# Patient Record
Sex: MALE | Race: WHITE | Employment: FULL TIME | URBAN - METROPOLITAN AREA
[De-identification: names, ages, dates, MRNs, and addresses within clinical notes are randomized per-mention and may not be internally consistent; named-entity substitution may affect disease eponyms.]

---

## 2017-01-25 ENCOUNTER — OFFICE VISIT (OUTPATIENT)
Dept: INTERNAL MEDICINE CLINIC | Age: 47
End: 2017-01-25

## 2017-01-25 VITALS
HEIGHT: 72 IN | HEART RATE: 76 BPM | SYSTOLIC BLOOD PRESSURE: 118 MMHG | WEIGHT: 252 LBS | DIASTOLIC BLOOD PRESSURE: 84 MMHG | BODY MASS INDEX: 34.13 KG/M2

## 2017-01-25 DIAGNOSIS — I10 ESSENTIAL HYPERTENSION: ICD-10-CM

## 2017-01-25 DIAGNOSIS — L60.0 INGROWN RIGHT BIG TOENAIL: Primary | ICD-10-CM

## 2017-01-25 LAB
A/G RATIO: 1.7 (ref 1.1–2.2)
ALBUMIN SERPL-MCNC: 4.3 G/DL (ref 3.4–5)
ALP BLD-CCNC: 57 U/L (ref 40–129)
ALT SERPL-CCNC: 44 U/L (ref 10–40)
ANION GAP SERPL CALCULATED.3IONS-SCNC: 14 MMOL/L (ref 3–16)
AST SERPL-CCNC: 22 U/L (ref 15–37)
BILIRUB SERPL-MCNC: 0.5 MG/DL (ref 0–1)
BUN BLDV-MCNC: 19 MG/DL (ref 7–20)
CALCIUM SERPL-MCNC: 9.3 MG/DL (ref 8.3–10.6)
CHLORIDE BLD-SCNC: 104 MMOL/L (ref 99–110)
CO2: 26 MMOL/L (ref 21–32)
CREAT SERPL-MCNC: 1.2 MG/DL (ref 0.9–1.3)
GFR AFRICAN AMERICAN: >60
GFR NON-AFRICAN AMERICAN: >60
GLOBULIN: 2.5 G/DL
GLUCOSE BLD-MCNC: 101 MG/DL (ref 70–99)
POTASSIUM SERPL-SCNC: 4.6 MMOL/L (ref 3.5–5.1)
SODIUM BLD-SCNC: 144 MMOL/L (ref 136–145)
TOTAL PROTEIN: 6.8 G/DL (ref 6.4–8.2)
TSH REFLEX: 1.85 UIU/ML (ref 0.27–4.2)

## 2017-01-25 PROCEDURE — 99213 OFFICE O/P EST LOW 20 MIN: CPT | Performed by: INTERNAL MEDICINE

## 2017-01-25 RX ORDER — CEPHALEXIN 500 MG/1
500 CAPSULE ORAL 4 TIMES DAILY
Qty: 40 CAPSULE | Refills: 0 | Status: SHIPPED | OUTPATIENT
Start: 2017-01-25 | End: 2017-02-04

## 2017-01-25 RX ORDER — BENAZEPRIL HYDROCHLORIDE AND HYDROCHLOROTHIAZIDE 20; 12.5 MG/1; MG/1
1 TABLET ORAL DAILY
Qty: 90 TABLET | Refills: 1 | Status: SHIPPED | OUTPATIENT
Start: 2017-01-25 | End: 2017-09-19 | Stop reason: SDUPTHER

## 2017-01-25 ASSESSMENT — ENCOUNTER SYMPTOMS
NAUSEA: 0
COUGH: 0
WHEEZING: 0
VOMITING: 0
ABDOMINAL PAIN: 0

## 2017-01-25 ASSESSMENT — PATIENT HEALTH QUESTIONNAIRE - PHQ9
SUM OF ALL RESPONSES TO PHQ9 QUESTIONS 1 & 2: 0
1. LITTLE INTEREST OR PLEASURE IN DOING THINGS: 0
2. FEELING DOWN, DEPRESSED OR HOPELESS: 0
SUM OF ALL RESPONSES TO PHQ QUESTIONS 1-9: 0

## 2017-09-19 ENCOUNTER — OFFICE VISIT (OUTPATIENT)
Dept: INTERNAL MEDICINE CLINIC | Age: 47
End: 2017-09-19

## 2017-09-19 VITALS
SYSTOLIC BLOOD PRESSURE: 128 MMHG | HEART RATE: 72 BPM | WEIGHT: 260.6 LBS | HEIGHT: 72 IN | BODY MASS INDEX: 35.3 KG/M2 | DIASTOLIC BLOOD PRESSURE: 80 MMHG

## 2017-09-19 DIAGNOSIS — I10 ESSENTIAL HYPERTENSION: ICD-10-CM

## 2017-09-19 DIAGNOSIS — E78.5 HYPERLIPIDEMIA, UNSPECIFIED HYPERLIPIDEMIA TYPE: ICD-10-CM

## 2017-09-19 DIAGNOSIS — E66.9 OBESITY (BMI 30-39.9): ICD-10-CM

## 2017-09-19 DIAGNOSIS — I10 ESSENTIAL HYPERTENSION: Primary | ICD-10-CM

## 2017-09-19 LAB
ANION GAP SERPL CALCULATED.3IONS-SCNC: 14 MMOL/L (ref 3–16)
BILIRUBIN URINE: NEGATIVE
BLOOD, URINE: NEGATIVE
BUN BLDV-MCNC: 19 MG/DL (ref 7–20)
CALCIUM SERPL-MCNC: 9.6 MG/DL (ref 8.3–10.6)
CHLORIDE BLD-SCNC: 102 MMOL/L (ref 99–110)
CLARITY: CLEAR
CO2: 24 MMOL/L (ref 21–32)
COLOR: YELLOW
CREAT SERPL-MCNC: 1.3 MG/DL (ref 0.9–1.3)
GFR AFRICAN AMERICAN: >60
GFR NON-AFRICAN AMERICAN: 59
GLUCOSE BLD-MCNC: 96 MG/DL (ref 70–99)
GLUCOSE URINE: NEGATIVE MG/DL
KETONES, URINE: NEGATIVE MG/DL
LEUKOCYTE ESTERASE, URINE: NEGATIVE
MICROSCOPIC EXAMINATION: NORMAL
NITRITE, URINE: NEGATIVE
PH UA: 6
POTASSIUM SERPL-SCNC: 4.5 MMOL/L (ref 3.5–5.1)
PROTEIN UA: NEGATIVE MG/DL
SODIUM BLD-SCNC: 140 MMOL/L (ref 136–145)
SPECIFIC GRAVITY UA: 1.01
URINE TYPE: NORMAL
UROBILINOGEN, URINE: 0.2 E.U./DL

## 2017-09-19 PROCEDURE — 99213 OFFICE O/P EST LOW 20 MIN: CPT | Performed by: INTERNAL MEDICINE

## 2017-09-19 RX ORDER — BENAZEPRIL HYDROCHLORIDE AND HYDROCHLOROTHIAZIDE 20; 12.5 MG/1; MG/1
1 TABLET ORAL DAILY
Qty: 90 TABLET | Refills: 1 | Status: SHIPPED | OUTPATIENT
Start: 2017-09-19 | End: 2018-05-21 | Stop reason: SDUPTHER

## 2017-09-19 ASSESSMENT — ENCOUNTER SYMPTOMS
COUGH: 0
SHORTNESS OF BREATH: 0
WHEEZING: 0

## 2017-09-20 LAB
ESTIMATED AVERAGE GLUCOSE: 111.2 MG/DL
HBA1C MFR BLD: 5.5 %

## 2018-03-21 ENCOUNTER — OFFICE VISIT (OUTPATIENT)
Dept: INTERNAL MEDICINE CLINIC | Age: 48
End: 2018-03-21

## 2018-03-21 VITALS
DIASTOLIC BLOOD PRESSURE: 88 MMHG | WEIGHT: 257 LBS | SYSTOLIC BLOOD PRESSURE: 112 MMHG | HEART RATE: 72 BPM | BODY MASS INDEX: 35.34 KG/M2

## 2018-03-21 DIAGNOSIS — E78.5 HYPERLIPIDEMIA, UNSPECIFIED HYPERLIPIDEMIA TYPE: ICD-10-CM

## 2018-03-21 DIAGNOSIS — B02.9 HERPES ZOSTER WITHOUT COMPLICATION: Primary | ICD-10-CM

## 2018-03-21 DIAGNOSIS — E66.9 OBESITY (BMI 30-39.9): ICD-10-CM

## 2018-03-21 DIAGNOSIS — I10 ESSENTIAL HYPERTENSION: ICD-10-CM

## 2018-03-21 PROCEDURE — 99214 OFFICE O/P EST MOD 30 MIN: CPT | Performed by: INTERNAL MEDICINE

## 2018-03-21 RX ORDER — ATORVASTATIN CALCIUM 20 MG/1
20 TABLET, FILM COATED ORAL DAILY
Qty: 30 TABLET | Refills: 3 | Status: SHIPPED | OUTPATIENT
Start: 2018-03-21 | End: 2018-07-16 | Stop reason: SDUPTHER

## 2018-03-21 RX ORDER — VALACYCLOVIR HYDROCHLORIDE 1 G/1
1000 TABLET, FILM COATED ORAL 2 TIMES DAILY
Qty: 14 TABLET | Refills: 0 | Status: SHIPPED | OUTPATIENT
Start: 2018-03-21 | End: 2018-03-28 | Stop reason: ALTCHOICE

## 2018-03-21 ASSESSMENT — ENCOUNTER SYMPTOMS
VOMITING: 0
EYE PAIN: 0
PHOTOPHOBIA: 0
WHEEZING: 0
ABDOMINAL PAIN: 0
NAUSEA: 0
EYE DISCHARGE: 0
SHORTNESS OF BREATH: 0

## 2018-03-21 NOTE — PROGRESS NOTES
Eric Saenz  1970  male  50 y.o. SUBJECTIVE:    Chief Complaint   Patient presents with    Rash     facial--non painful--near lt eye.  was working in garage over the weekend. hx of chicken pox       HPI:  Patient has been complaining of gradual onset of red blisterlike rash at the left cheek area since yesterday. He denies any headache or fever chills and eye symptoms. He also brought his lipid profile results. His last LDL about 2 years ago was 178. His 10 year cardiovascular risk is about  13.4 percent    Blood pressure has been well controlled    History reviewed. No pertinent past medical history. Past Surgical History:   Procedure Laterality Date    TONSILLECTOMY AND ADENOIDECTOMY  1974     Social History     Social History    Marital status:      Spouse name: N/A    Number of children: 3    Years of education: N/A     Occupational History          [address]     Social History Main Topics    Smoking status: Never Smoker    Smokeless tobacco: Never Used    Alcohol use Yes      Comment: socially, varies    Drug use: No    Sexual activity: Yes     Partners: Female     Other Topics Concern    None     Social History Narrative    None     Family History   Problem Relation Age of Onset    Migraines Mother     High Blood Pressure Father     Heart Surgery Father      cabg x3    Heart Failure Maternal Grandmother     Diabetes Other      aunt, niece       Review of Systems   Constitutional: Negative for diaphoresis and unexpected weight change. Eyes: Negative for photophobia, pain, discharge and visual disturbance. Respiratory: Negative for shortness of breath and wheezing. Cardiovascular: Negative for chest pain and leg swelling. Gastrointestinal: Negative for abdominal pain, nausea and vomiting. Musculoskeletal: Negative for neck pain and neck stiffness. Skin: Positive for rash.    Neurological: Negative for dizziness, syncope, speech difficulty, weakness, light-headedness, numbness and headaches. Hematological: Negative for adenopathy. Does not bruise/bleed easily. OBJECTIVE:  Pulse Readings from Last 4 Encounters:   03/21/18 72   09/19/17 72   01/25/17 76   12/14/16 80      Wt Readings from Last 4 Encounters:   03/21/18 257 lb (116.6 kg)   09/19/17 260 lb 9.6 oz (118.2 kg)   01/25/17 252 lb (114.3 kg)   12/14/16 254 lb (115.2 kg)     BP Readings from Last 4 Encounters:   03/21/18 112/88   09/19/17 128/80   01/25/17 118/84   12/14/16 (!) 160/102     Physical Exam   Constitutional: He is oriented to person, place, and time. He appears well-developed and well-nourished. No distress. Eyes: Conjunctivae and EOM are normal. Pupils are equal, round, and reactive to light. Neck: Neck supple. No thyromegaly present. Cardiovascular: Normal rate, regular rhythm and normal heart sounds. Pulmonary/Chest: Effort normal and breath sounds normal. No respiratory distress. He has no wheezes. Musculoskeletal: He exhibits no edema or tenderness. Lymphadenopathy:     He has no cervical adenopathy. Neurological: He is alert and oriented to person, place, and time. No cranial nerve deficit. Skin: Rash noted. Papulovesicular erythematous rash at the left cheek area   Psychiatric: He has a normal mood and affect. Nursing note and vitals reviewed.       CBC:   Lab Results   Component Value Date    WBC 4.5 03/12/2010    HGB 16.9 03/12/2010    HCT 49.6 03/12/2010     03/12/2010     CMP:   Lab Results   Component Value Date     09/19/2017    K 4.5 09/19/2017     09/19/2017    CO2 24 09/19/2017    ANIONGAP 14 09/19/2017    GLUCOSE 96 09/19/2017    BUN 19 09/19/2017    CREATININE 1.3 09/19/2017    GFRAA >60 09/19/2017    GFRAA >60 03/12/2010    CALCIUM 9.6 09/19/2017    PROT 6.8 01/25/2017    LABALBU 4.3 01/25/2017    AGRATIO 1.7 01/25/2017    BILITOT 0.5 01/25/2017    ALKPHOS 57 01/25/2017    ALT 44 01/25/2017    AST 22

## 2018-03-28 ENCOUNTER — OFFICE VISIT (OUTPATIENT)
Dept: INTERNAL MEDICINE CLINIC | Age: 48
End: 2018-03-28

## 2018-03-28 VITALS
BODY MASS INDEX: 34.27 KG/M2 | DIASTOLIC BLOOD PRESSURE: 86 MMHG | HEIGHT: 72 IN | HEART RATE: 72 BPM | SYSTOLIC BLOOD PRESSURE: 118 MMHG | WEIGHT: 253 LBS

## 2018-03-28 DIAGNOSIS — I10 ESSENTIAL HYPERTENSION: ICD-10-CM

## 2018-03-28 DIAGNOSIS — I10 ESSENTIAL HYPERTENSION: Primary | ICD-10-CM

## 2018-03-28 DIAGNOSIS — L30.9 ECZEMA, UNSPECIFIED TYPE: ICD-10-CM

## 2018-03-28 DIAGNOSIS — E78.5 HYPERLIPIDEMIA, UNSPECIFIED HYPERLIPIDEMIA TYPE: ICD-10-CM

## 2018-03-28 LAB
ANION GAP SERPL CALCULATED.3IONS-SCNC: 15 MMOL/L (ref 3–16)
AST SERPL-CCNC: 23 U/L (ref 15–37)
BUN BLDV-MCNC: 17 MG/DL (ref 7–20)
CALCIUM SERPL-MCNC: 9.5 MG/DL (ref 8.3–10.6)
CHLORIDE BLD-SCNC: 98 MMOL/L (ref 99–110)
CO2: 28 MMOL/L (ref 21–32)
CREAT SERPL-MCNC: 1.4 MG/DL (ref 0.9–1.3)
GFR AFRICAN AMERICAN: >60
GFR NON-AFRICAN AMERICAN: 54
GLUCOSE BLD-MCNC: 76 MG/DL (ref 70–99)
POTASSIUM SERPL-SCNC: 4.5 MMOL/L (ref 3.5–5.1)
SODIUM BLD-SCNC: 141 MMOL/L (ref 136–145)

## 2018-03-28 PROCEDURE — 99214 OFFICE O/P EST MOD 30 MIN: CPT | Performed by: INTERNAL MEDICINE

## 2018-03-28 ASSESSMENT — ENCOUNTER SYMPTOMS
ABDOMINAL PAIN: 0
WHEEZING: 0
SHORTNESS OF BREATH: 0
VOMITING: 0
NAUSEA: 0

## 2018-03-28 ASSESSMENT — PATIENT HEALTH QUESTIONNAIRE - PHQ9
2. FEELING DOWN, DEPRESSED OR HOPELESS: 0
SUM OF ALL RESPONSES TO PHQ9 QUESTIONS 1 & 2: 0
1. LITTLE INTEREST OR PLEASURE IN DOING THINGS: 0
SUM OF ALL RESPONSES TO PHQ QUESTIONS 1-9: 0

## 2018-03-28 NOTE — PROGRESS NOTES
Subjective:      Chief Complaint   Patient presents with    6 Month Follow-Up    Herpes Zoster     saw opthamologist-no blurred vision--cheek area feels a little tender. Patient ID: Luzma Givens is a 50 y.o. male. HPI  Hypertension:    Luzma Givens returns for follow up of hypertension. Tolerating medications well and taking them as directed. Does not check BP at home. No symptoms (denies chest pain,dyspnea,edema or TIA's or blurred vision) concerning for end organ damage are present. Hyperlipidemia:    Patient returns for follow up of hyperlipidemia. Patient has been taking His medications as prescribed. Patient's lipids are not controlled. Denies myalgias or any GI upset. Side effects related to taking the medications include none. see scanned recent lipid profile    Status post herpes zoster  infection of the left side of the face. He denies headache, visual symptoms. He is feeling slightly pain and numbness to the affected area. He is also complaining of itchy dry scaly rash at the right palmar surface for about a week. Review of Systems   Constitutional: Negative for fatigue and unexpected weight change. Respiratory: Negative for shortness of breath and wheezing. Cardiovascular: Negative for chest pain, palpitations and leg swelling. Gastrointestinal: Negative for abdominal pain, nausea and vomiting. Endocrine: Negative for polyphagia and polyuria. Skin: Positive for rash. Neurological: Negative for dizziness, tremors and headaches.      No Known Allergies  Social History     Social History    Marital status:      Spouse name: N/A    Number of children: 3    Years of education: N/A     Occupational History          [address]     Social History Main Topics    Smoking status: Never Smoker    Smokeless tobacco: Never Used    Alcohol use Yes      Comment: socially, varies    Drug use: No    Sexual activity: Yes     Partners:

## 2018-03-29 ENCOUNTER — TELEPHONE (OUTPATIENT)
Dept: INTERNAL MEDICINE CLINIC | Age: 48
End: 2018-03-29

## 2018-05-21 DIAGNOSIS — I10 ESSENTIAL HYPERTENSION: ICD-10-CM

## 2018-05-21 RX ORDER — BENAZEPRIL HYDROCHLORIDE AND HYDROCHLOROTHIAZIDE 20; 12.5 MG/1; MG/1
1 TABLET ORAL DAILY
Qty: 90 TABLET | Refills: 1 | Status: SHIPPED | OUTPATIENT
Start: 2018-05-21 | End: 2018-11-16 | Stop reason: SDUPTHER

## 2018-06-20 ENCOUNTER — NURSE ONLY (OUTPATIENT)
Dept: CARDIOLOGY CLINIC | Age: 48
End: 2018-06-20

## 2018-06-20 ENCOUNTER — TELEPHONE (OUTPATIENT)
Dept: CARDIOLOGY CLINIC | Age: 48
End: 2018-06-20

## 2018-06-20 DIAGNOSIS — Z00.00 ANNUAL PHYSICAL EXAM: Primary | ICD-10-CM

## 2018-06-20 LAB
A/G RATIO: 1.9 (ref 1.1–2.2)
ALBUMIN SERPL-MCNC: 4.6 G/DL (ref 3.4–5)
ALP BLD-CCNC: 66 U/L (ref 40–129)
ALT SERPL-CCNC: 30 U/L (ref 10–40)
ANION GAP SERPL CALCULATED.3IONS-SCNC: 14 MMOL/L (ref 3–16)
AST SERPL-CCNC: 18 U/L (ref 15–37)
BILIRUB SERPL-MCNC: 1 MG/DL (ref 0–1)
BILIRUBIN DIRECT: <0.2 MG/DL (ref 0–0.3)
BILIRUBIN, INDIRECT: NORMAL MG/DL (ref 0–1)
BUN BLDV-MCNC: 15 MG/DL (ref 7–20)
CALCIUM SERPL-MCNC: 9.6 MG/DL (ref 8.3–10.6)
CHLORIDE BLD-SCNC: 100 MMOL/L (ref 99–110)
CHOLESTEROL, TOTAL: 190 MG/DL (ref 0–199)
CO2: 27 MMOL/L (ref 21–32)
CREAT SERPL-MCNC: 1.3 MG/DL (ref 0.9–1.3)
GFR AFRICAN AMERICAN: >60
GFR NON-AFRICAN AMERICAN: 58
GLOBULIN: 2.4 G/DL
GLUCOSE BLD-MCNC: 113 MG/DL (ref 70–99)
HDLC SERPL-MCNC: 35 MG/DL (ref 40–60)
HEPATITIS C ANTIBODY INTERPRETATION: NORMAL
LDL CHOLESTEROL CALCULATED: 121 MG/DL
POTASSIUM SERPL-SCNC: 4.4 MMOL/L (ref 3.5–5.1)
PROSTATE SPECIFIC ANTIGEN: 0.79 NG/ML (ref 0–4)
REASON FOR REJECTION: NORMAL
REJECTED TEST: NORMAL
SODIUM BLD-SCNC: 141 MMOL/L (ref 136–145)
T3 TOTAL: 1.4 NG/ML (ref 0.8–2)
T4 TOTAL: 9.1 UG/DL (ref 4.5–10.9)
TOTAL PROTEIN: 7 G/DL (ref 6.4–8.2)
TRIGL SERPL-MCNC: 171 MG/DL (ref 0–150)
VLDLC SERPL CALC-MCNC: 34 MG/DL

## 2018-06-21 ENCOUNTER — NURSE ONLY (OUTPATIENT)
Dept: CARDIOLOGY CLINIC | Age: 48
End: 2018-06-21

## 2018-06-21 LAB
BASOPHILS ABSOLUTE: 0.1 K/UL (ref 0–0.2)
BASOPHILS RELATIVE PERCENT: 1 %
EOSINOPHILS ABSOLUTE: 0.3 K/UL (ref 0–0.6)
EOSINOPHILS RELATIVE PERCENT: 4.4 %
HCT VFR BLD CALC: 50 % (ref 40.5–52.5)
HEMOGLOBIN: 17.1 G/DL (ref 13.5–17.5)
LYMPHOCYTES ABSOLUTE: 1.6 K/UL (ref 1–5.1)
LYMPHOCYTES RELATIVE PERCENT: 27.7 %
MCH RBC QN AUTO: 30.3 PG (ref 26–34)
MCHC RBC AUTO-ENTMCNC: 34.3 G/DL (ref 31–36)
MCV RBC AUTO: 88.3 FL (ref 80–100)
MONOCYTES ABSOLUTE: 0.7 K/UL (ref 0–1.3)
MONOCYTES RELATIVE PERCENT: 12.5 %
NEUTROPHILS ABSOLUTE: 3.2 K/UL (ref 1.7–7.7)
NEUTROPHILS RELATIVE PERCENT: 54.4 %
PDW BLD-RTO: 13.8 % (ref 12.4–15.4)
PLATELET # BLD: 170 K/UL (ref 135–450)
PMV BLD AUTO: 10.2 FL (ref 5–10.5)
RBC # BLD: 5.66 M/UL (ref 4.2–5.9)
WBC # BLD: 6 K/UL (ref 4–11)

## 2018-06-28 ENCOUNTER — OFFICE VISIT (OUTPATIENT)
Dept: CARDIOLOGY CLINIC | Age: 48
End: 2018-06-28

## 2018-06-28 ENCOUNTER — HOSPITAL ENCOUNTER (OUTPATIENT)
Dept: OTHER | Age: 48
Discharge: HOME | End: 2018-06-28

## 2018-06-28 VITALS
BODY MASS INDEX: 34.23 KG/M2 | WEIGHT: 252.7 LBS | DIASTOLIC BLOOD PRESSURE: 80 MMHG | HEART RATE: 80 BPM | SYSTOLIC BLOOD PRESSURE: 132 MMHG | HEIGHT: 72 IN

## 2018-06-28 DIAGNOSIS — Z00.00 ANNUAL PHYSICAL EXAM: Primary | ICD-10-CM

## 2018-06-28 PROCEDURE — 99214 OFFICE O/P EST MOD 30 MIN: CPT | Performed by: NURSE PRACTITIONER

## 2018-06-28 PROCEDURE — MISCLABM CINFIN LAB WORK PLUS PSA: Performed by: NURSE PRACTITIONER

## 2018-06-28 PROCEDURE — 93015 CV STRESS TEST SUPVJ I&R: CPT | Performed by: INTERNAL MEDICINE

## 2018-07-16 DIAGNOSIS — E78.5 HYPERLIPIDEMIA, UNSPECIFIED HYPERLIPIDEMIA TYPE: ICD-10-CM

## 2018-07-16 RX ORDER — ATORVASTATIN CALCIUM 20 MG/1
20 TABLET, FILM COATED ORAL DAILY
Qty: 30 TABLET | Refills: 3 | Status: SHIPPED | OUTPATIENT
Start: 2018-07-16 | End: 2018-11-18 | Stop reason: SDUPTHER

## 2018-09-18 ENCOUNTER — OFFICE VISIT (OUTPATIENT)
Dept: INTERNAL MEDICINE CLINIC | Age: 48
End: 2018-09-18

## 2018-09-18 VITALS
BODY MASS INDEX: 34.57 KG/M2 | SYSTOLIC BLOOD PRESSURE: 112 MMHG | DIASTOLIC BLOOD PRESSURE: 80 MMHG | HEIGHT: 72 IN | HEART RATE: 78 BPM | WEIGHT: 255.2 LBS

## 2018-09-18 DIAGNOSIS — E78.5 HYPERLIPIDEMIA, UNSPECIFIED HYPERLIPIDEMIA TYPE: ICD-10-CM

## 2018-09-18 DIAGNOSIS — I10 ESSENTIAL HYPERTENSION: Primary | ICD-10-CM

## 2018-09-18 DIAGNOSIS — Z12.11 COLON CANCER SCREENING: ICD-10-CM

## 2018-09-18 DIAGNOSIS — R73.9 HYPERGLYCEMIA: ICD-10-CM

## 2018-09-18 LAB — TOTAL CK: 298 U/L (ref 39–308)

## 2018-09-18 PROCEDURE — 99214 OFFICE O/P EST MOD 30 MIN: CPT | Performed by: INTERNAL MEDICINE

## 2018-09-18 ASSESSMENT — PATIENT HEALTH QUESTIONNAIRE - PHQ9
SUM OF ALL RESPONSES TO PHQ9 QUESTIONS 1 & 2: 0
SUM OF ALL RESPONSES TO PHQ QUESTIONS 1-9: 0
2. FEELING DOWN, DEPRESSED OR HOPELESS: 0
1. LITTLE INTEREST OR PLEASURE IN DOING THINGS: 0
SUM OF ALL RESPONSES TO PHQ QUESTIONS 1-9: 0

## 2018-09-18 ASSESSMENT — ENCOUNTER SYMPTOMS
ABDOMINAL PAIN: 0
NAUSEA: 0
WHEEZING: 0
SHORTNESS OF BREATH: 0
VOMITING: 0

## 2018-09-18 NOTE — PATIENT INSTRUCTIONS
any severe, life-threatening allergies. A person who has ever had a life-threatening allergic reaction after a dose of recombinant shingles vaccine, or has a severe allergy to any component of this vaccine, may be advised not to be vaccinated. Ask your health care provider if you want information about vaccine components. · Are pregnant or breastfeeding. There is not much information about use of recombinant shingles vaccine in pregnant or nursing women. Your healthcare provider might recommend delaying vaccination. · Are not feeling well. If you have a mild illness, such as a cold, you can probably get the vaccine today. If you are moderately or severely ill, you should probably wait until you recover. Your doctor can advise you. Risks of a vaccine reaction  With any medicine, including vaccines, there is a chance of reactions. After recombinant shingles vaccination, a person might experience:  · Pain, redness, soreness, or swelling at the site of the injection  · Headache, muscle aches, fever, shivering, fatigue  In clinical trials, most people got a sore arm with mild or moderate pain after vaccination, and some also had redness and swelling where they got the shot. Some people felt tired, had muscle pain, a headache, shivering, fever, stomach pain, or nausea. About 1 out of 6 people who got recombinant zoster vaccine experienced side effects that prevented them from doing regular activities. Symptoms went away on their own in about 2 to 3 days. Side effects were more common in younger people. You should still get the second dose of recombinant zoster vaccine even if you had one of these reactions after the first dose. Other things that could happen after this vaccine:  · People sometimes faint after medical procedures, including vaccination. Sitting or lying down for about 15 minutes can help prevent fainting and injuries caused by a fall.  Tell your provider if you feel dizzy or have vision changes or

## 2018-09-19 PROBLEM — R73.03 PREDIABETES: Status: ACTIVE | Noted: 2018-09-19

## 2018-09-19 LAB
ESTIMATED AVERAGE GLUCOSE: 116.9 MG/DL
HBA1C MFR BLD: 5.7 %

## 2018-11-16 DIAGNOSIS — I10 ESSENTIAL HYPERTENSION: ICD-10-CM

## 2018-11-16 RX ORDER — BENAZEPRIL HYDROCHLORIDE AND HYDROCHLOROTHIAZIDE 20; 12.5 MG/1; MG/1
TABLET ORAL
Qty: 90 TABLET | Refills: 1 | Status: SHIPPED | OUTPATIENT
Start: 2018-11-16 | End: 2019-05-26 | Stop reason: SDUPTHER

## 2019-03-14 ENCOUNTER — OFFICE VISIT (OUTPATIENT)
Dept: INTERNAL MEDICINE CLINIC | Age: 49
End: 2019-03-14
Payer: COMMERCIAL

## 2019-03-14 VITALS
DIASTOLIC BLOOD PRESSURE: 84 MMHG | BODY MASS INDEX: 35.21 KG/M2 | HEART RATE: 72 BPM | HEIGHT: 72 IN | SYSTOLIC BLOOD PRESSURE: 126 MMHG | WEIGHT: 260 LBS

## 2019-03-14 DIAGNOSIS — R73.03 PREDIABETES: ICD-10-CM

## 2019-03-14 DIAGNOSIS — I10 ESSENTIAL HYPERTENSION: ICD-10-CM

## 2019-03-14 DIAGNOSIS — E78.5 HYPERLIPIDEMIA, UNSPECIFIED HYPERLIPIDEMIA TYPE: ICD-10-CM

## 2019-03-14 DIAGNOSIS — D58.2 ELEVATED HEMOGLOBIN (HCC): Primary | ICD-10-CM

## 2019-03-14 DIAGNOSIS — D58.2 ELEVATED HEMOGLOBIN (HCC): ICD-10-CM

## 2019-03-14 LAB
BLOOD SMEAR REVIEW: NORMAL
HCT VFR BLD CALC: 54.1 % (ref 40.5–52.5)
HEMATOLOGY PATH CONSULT: YES
HEMOGLOBIN: 17.7 G/DL (ref 13.5–17.5)
MCH RBC QN AUTO: 30.1 PG (ref 26–34)
MCHC RBC AUTO-ENTMCNC: 32.7 G/DL (ref 31–36)
MCV RBC AUTO: 91.9 FL (ref 80–100)
PDW BLD-RTO: 14 % (ref 12.4–15.4)
PLATELET # BLD: 210 K/UL (ref 135–450)
PMV BLD AUTO: 10.6 FL (ref 5–10.5)
RBC # BLD: 5.89 M/UL (ref 4.2–5.9)
WBC # BLD: 6.2 K/UL (ref 4–11)

## 2019-03-14 PROCEDURE — 99214 OFFICE O/P EST MOD 30 MIN: CPT | Performed by: INTERNAL MEDICINE

## 2019-03-14 ASSESSMENT — ENCOUNTER SYMPTOMS
WHEEZING: 0
SHORTNESS OF BREATH: 0
ABDOMINAL PAIN: 0
NAUSEA: 0
PHOTOPHOBIA: 0
VOMITING: 0

## 2019-03-14 ASSESSMENT — PATIENT HEALTH QUESTIONNAIRE - PHQ9
1. LITTLE INTEREST OR PLEASURE IN DOING THINGS: 0
SUM OF ALL RESPONSES TO PHQ9 QUESTIONS 1 & 2: 0
SUM OF ALL RESPONSES TO PHQ QUESTIONS 1-9: 0
SUM OF ALL RESPONSES TO PHQ QUESTIONS 1-9: 0
2. FEELING DOWN, DEPRESSED OR HOPELESS: 0

## 2019-03-15 LAB — HEMATOLOGY PATH CONSULT: NORMAL

## 2019-03-16 DIAGNOSIS — D58.2 ELEVATED HEMOGLOBIN (HCC): Primary | ICD-10-CM

## 2019-03-16 DIAGNOSIS — D75.1 ACQUIRED POLYCYTHEMIA: ICD-10-CM

## 2020-04-03 ENCOUNTER — TELEPHONE (OUTPATIENT)
Dept: INTERNAL MEDICINE CLINIC | Age: 50
End: 2020-04-03

## 2020-04-07 ENCOUNTER — VIRTUAL VISIT (OUTPATIENT)
Dept: INTERNAL MEDICINE CLINIC | Age: 50
End: 2020-04-07
Payer: COMMERCIAL

## 2020-04-07 VITALS — TEMPERATURE: 97.3 F | WEIGHT: 255 LBS | BODY MASS INDEX: 34.58 KG/M2

## 2020-04-07 PROCEDURE — 99214 OFFICE O/P EST MOD 30 MIN: CPT | Performed by: INTERNAL MEDICINE

## 2020-04-07 RX ORDER — ATORVASTATIN CALCIUM 20 MG/1
TABLET, FILM COATED ORAL
Qty: 90 TABLET | Refills: 1 | Status: SHIPPED | OUTPATIENT
Start: 2020-04-07 | End: 2020-06-08

## 2020-04-07 RX ORDER — BENAZEPRIL HYDROCHLORIDE AND HYDROCHLOROTHIAZIDE 20; 12.5 MG/1; MG/1
TABLET ORAL
Qty: 90 TABLET | Refills: 1 | Status: SHIPPED | OUTPATIENT
Start: 2020-04-07 | End: 2020-05-18

## 2020-04-07 RX ORDER — ASPIRIN 81 MG/1
81 TABLET ORAL DAILY
Qty: 90 TABLET | Refills: 1 | COMMUNITY
Start: 2020-04-07

## 2020-04-07 SDOH — ECONOMIC STABILITY: FOOD INSECURITY: WITHIN THE PAST 12 MONTHS, THE FOOD YOU BOUGHT JUST DIDN'T LAST AND YOU DIDN'T HAVE MONEY TO GET MORE.: NEVER TRUE

## 2020-04-07 SDOH — ECONOMIC STABILITY: INCOME INSECURITY: HOW HARD IS IT FOR YOU TO PAY FOR THE VERY BASICS LIKE FOOD, HOUSING, MEDICAL CARE, AND HEATING?: NOT HARD AT ALL

## 2020-04-07 SDOH — ECONOMIC STABILITY: FOOD INSECURITY: WITHIN THE PAST 12 MONTHS, YOU WORRIED THAT YOUR FOOD WOULD RUN OUT BEFORE YOU GOT MONEY TO BUY MORE.: NEVER TRUE

## 2020-04-07 SDOH — ECONOMIC STABILITY: TRANSPORTATION INSECURITY
IN THE PAST 12 MONTHS, HAS THE LACK OF TRANSPORTATION KEPT YOU FROM MEDICAL APPOINTMENTS OR FROM GETTING MEDICATIONS?: NO

## 2020-04-07 SDOH — ECONOMIC STABILITY: TRANSPORTATION INSECURITY
IN THE PAST 12 MONTHS, HAS LACK OF TRANSPORTATION KEPT YOU FROM MEETINGS, WORK, OR FROM GETTING THINGS NEEDED FOR DAILY LIVING?: NO

## 2020-04-07 ASSESSMENT — ENCOUNTER SYMPTOMS
NAUSEA: 0
SHORTNESS OF BREATH: 0
VOMITING: 0
ABDOMINAL PAIN: 0

## 2020-04-07 ASSESSMENT — PATIENT HEALTH QUESTIONNAIRE - PHQ9
SUM OF ALL RESPONSES TO PHQ9 QUESTIONS 1 & 2: 0
SUM OF ALL RESPONSES TO PHQ QUESTIONS 1-9: 0
1. LITTLE INTEREST OR PLEASURE IN DOING THINGS: 0
SUM OF ALL RESPONSES TO PHQ QUESTIONS 1-9: 0
2. FEELING DOWN, DEPRESSED OR HOPELESS: 0

## 2020-04-07 NOTE — PROGRESS NOTES
light-headedness. Psychiatric/Behavioral: Negative for sleep disturbance. Prior to Visit Medications    Medication Sig Taking? Authorizing Provider   benazepril-hydrochlorthiazide (LOTENSIN HCT) 20-12.5 MG per tablet TAKE 1 TABLET BY MOUTH EVERY DAY Yes Sourav Eden MD   atorvastatin (LIPITOR) 20 MG tablet TAKE 1 TABLET BY MOUTH EVERY DAY Yes Sourav Eden MD   aspirin EC 81 MG EC tablet Take 1 tablet by mouth daily Yes Sourav Eden MD       Social History     Tobacco Use    Smoking status: Never Smoker    Smokeless tobacco: Never Used   Substance Use Topics    Alcohol use: Yes     Comment: socially, varies    Drug use: No         No Known Allergies, No past medical history on file.,   Past Surgical History:   Procedure Laterality Date    TONSILLECTOMY AND ADENOIDECTOMY  1974       PHYSICAL EXAMINATION:  [ INSTRUCTIONS:  \"[x]\" Indicates a positive item  \"[]\" Indicates a negative item  -- DELETE ALL ITEMS NOT EXAMINED]  Vital Signs: (As obtained by patient/caregiver or practitioner observation)    Blood pressure- 109/71 Heart rate- 87   Respiratory rate-    Temperature-  Pulse oximetry-     Constitutional: [x] Appears well-developed and well-nourished [x] No apparent distress      [] Abnormal-   Mental status  [x] Alert and awake  [x] Oriented to person/place/time [x]Able to follow commands      Eyes:  EOM    []  Normal  [] Abnormal-  Sclera  [x]  Normal  [] Abnormal -         Discharge [x]  None visible  [] Abnormal -    HENT:   [] Normocephalic, atraumatic.   [] Abnormal   [] Mouth/Throat: Mucous membranes are moist.     External Ears [x] Normal  [] Abnormal-     Neck: [x] No visualized mass     Pulmonary/Chest: [x] Respiratory effort normal.  [x] No visualized signs of difficulty breathing or respiratory distress        [] Abnormal-      Musculoskeletal:   [] Normal gait with no signs of ataxia         [] Normal range of motion of neck        [] Abnormal-       Neurological:        [x] No

## 2020-05-15 ENCOUNTER — TELEPHONE (OUTPATIENT)
Dept: INTERNAL MEDICINE CLINIC | Age: 50
End: 2020-05-15

## 2020-05-15 ENCOUNTER — NURSE TRIAGE (OUTPATIENT)
Dept: OTHER | Facility: CLINIC | Age: 50
End: 2020-05-15

## 2020-05-15 NOTE — TELEPHONE ENCOUNTER
Reason for Disposition   Cough has been present for > 3 weeks   Chest pain lasting longer than 5 minutes and ANY of the following:* Over 48years old* Over 27years old and at least one cardiac risk factor (i.e., high blood pressure, diabetes, high cholesterol, obesity, smoker or strong family history of heart disease)* Pain is crushing, pressure-like, or heavy * Took nitroglycerin and chest pain was not relieved* History of heart disease (i.e., angina, heart attack, bypass surgery, angioplasty, CHF)    Answer Assessment - Initial Assessment Questions  1. LOCATION: \"Where does it hurt? \"        There is a light \"pressure\" in the chest, in middle  2. RADIATION: \"Does the pain go anywhere else? \" (e.g., into neck, jaw, arms, back)      no  3. ONSET: \"When did the chest pain begin? \" (Minutes, hours or days)       It began last week or 2  4. PATTERN \"Does the pain come and go, or has it been constant since it started? \"  \"Does it get worse with exertion? \"       Comes and goes  5. DURATION: \"How long does it last\" (e.g., seconds, minutes, hours)      When present, it is noticed when he has to get a lot of stuff done  6. SEVERITY: \"How bad is the pain? \"  (e.g., Scale 1-10; mild, moderate, or severe)     - MILD (1-3): doesn't interfere with normal activities      - MODERATE (4-7): interferes with normal activities or awakens from sleep     - SEVERE (8-10): excruciating pain, unable to do any normal activities        Rates it 1/10 when present. He is aware of it right now  7. CARDIAC RISK FACTORS: \"Do you have any history of heart problems or risk factors for heart disease? \" (e.g., prior heart attack, angina; high blood pressure, diabetes, being overweight, high cholesterol, smoking, or strong family history of heart disease)      HTN  8. PULMONARY RISK FACTORS: \"Do you have any history of lung disease? \"  (e.g., blood clots in lung, asthma, emphysema, birth control pills)      no  9. CAUSE: \"What do you think is causing the chest pain? \"      No issues with exercising . 10. OTHER SYMPTOMS: \"Do you have any other symptoms? \" (e.g., dizziness, nausea, vomiting, sweating, fever, difficulty breathing, cough)        Cough, no fever  11. PREGNANCY: \"Is there any chance you are pregnant? \" \"When was your last menstrual period? \"        no    Answer Assessment - Initial Assessment Questions  1. ONSET: \"When did the cough begin? \"       A year plus ago  2. SEVERITY: \"How bad is the cough today? \"       More persistent lately, dry . 3. RESPIRATORY DISTRESS: \"Describe your breathing. \"      If take a deep breath, it'll cause him to cough, but no sob  4. FEVER: \"Do you have a fever? \" If so, ask: \"What is your temperature, how was it measured, and when did it start? \"      No fever  5. HEMOPTYSIS: \"Are you coughing up any blood? \" If so ask: \"How much? \" (flecks, streaks, tablespoons, etc.)      no  6. TREATMENT: \"What have you done so far to treat the cough? \" (e.g., meds, fluids, humidifier)      Cough drops every day for a year plus  7. CARDIAC HISTORY: \"Do you have any history of heart disease? \" (e.g., heart attack, congestive heart failure)       Pt is on ACE inhibitor for HTN  8. LUNG HISTORY: \"Do you have any history of lung disease? \"  (e.g., pulmonary embolus, asthma, emphysema)      no  9. PE RISK FACTORS: \"Do you have a history of blood clots? \" (or: recent major surgery, recent prolonged travel, bedridden)      no  10. OTHER SYMPTOMS: \"Do you have any other symptoms? (e.g., runny nose, wheezing, chest pain)        Week or so ago , decreased appetite. Pt has spring time allergies. Pt has \"pressure \" on chest - see triage. Sometimes it is more noticeable after eating. He does not cough in his sleep. 11. PREGNANCY: \"Is there any chance you are pregnant? \" \"When was your last menstrual period? \"        no  12. TRAVEL: \"Have you traveled out of the country in the last month? \" (e.g., travel history, exposures)        no    Protocols used:

## 2020-05-18 ENCOUNTER — OFFICE VISIT (OUTPATIENT)
Dept: INTERNAL MEDICINE CLINIC | Age: 50
End: 2020-05-18
Payer: COMMERCIAL

## 2020-05-18 VITALS
DIASTOLIC BLOOD PRESSURE: 80 MMHG | TEMPERATURE: 98 F | BODY MASS INDEX: 34 KG/M2 | HEIGHT: 72 IN | WEIGHT: 251 LBS | HEART RATE: 72 BPM | SYSTOLIC BLOOD PRESSURE: 138 MMHG

## 2020-05-18 PROCEDURE — 99214 OFFICE O/P EST MOD 30 MIN: CPT | Performed by: INTERNAL MEDICINE

## 2020-05-18 RX ORDER — LOSARTAN POTASSIUM AND HYDROCHLOROTHIAZIDE 12.5; 5 MG/1; MG/1
1 TABLET ORAL DAILY
Qty: 90 TABLET | Refills: 3 | Status: SHIPPED | OUTPATIENT
Start: 2020-05-18 | End: 2021-05-13 | Stop reason: SDUPTHER

## 2020-06-03 ENCOUNTER — HOSPITAL ENCOUNTER (OUTPATIENT)
Dept: CT IMAGING | Age: 50
Discharge: HOME | End: 2020-06-03
Payer: COMMERCIAL

## 2020-06-03 PROCEDURE — 75571 CT HRT W/O DYE W/CA TEST: CPT

## 2020-06-08 ENCOUNTER — OFFICE VISIT (OUTPATIENT)
Dept: CARDIOLOGY CLINIC | Age: 50
End: 2020-06-08
Payer: COMMERCIAL

## 2020-06-08 VITALS
HEART RATE: 82 BPM | OXYGEN SATURATION: 96 % | DIASTOLIC BLOOD PRESSURE: 72 MMHG | WEIGHT: 254.4 LBS | HEIGHT: 73 IN | BODY MASS INDEX: 33.72 KG/M2 | SYSTOLIC BLOOD PRESSURE: 118 MMHG

## 2020-06-08 PROCEDURE — 93000 ELECTROCARDIOGRAM COMPLETE: CPT | Performed by: INTERNAL MEDICINE

## 2020-06-08 PROCEDURE — 99214 OFFICE O/P EST MOD 30 MIN: CPT | Performed by: INTERNAL MEDICINE

## 2020-06-08 RX ORDER — ATORVASTATIN CALCIUM 40 MG/1
TABLET, FILM COATED ORAL
Qty: 90 TABLET | Refills: 1 | Status: SHIPPED | OUTPATIENT
Start: 2020-06-08 | End: 2020-12-22

## 2020-06-08 RX ORDER — CETIRIZINE HYDROCHLORIDE 10 MG/1
10 TABLET ORAL DAILY PRN
COMMUNITY
End: 2021-06-17

## 2020-06-08 NOTE — PROGRESS NOTES
no clubbing, cyanosis of the extremities. · No edema  · Femoral Arteries: 2+ and equal  · Pedal Pulses: 2+ and equal   Abdomen:  · No masses or tenderness  · Liver/Spleen: No Abnormalities Noted  Neurological/Psychiatric:  · Alert and oriented in all spheres  · Moves all extremities well  · Exhibits normal gait balance and coordination  · No abnormalities of mood, affect, memory, mentation, or behavior are noted      Assessment:    1. Elevated coronary artery calcium score   CT Calcium score 6/3/20> TOTAL [place] CALCIUM SCORE: 938  Plan for Stress Myoview soon      2. Hyperlipidemia- increase Lipitor to 40mg daily    3. Essential hypertension -Blood pressure 118/72, pulse 82, height 6' 1\" (1.854 m), weight 254 lb 6.4 oz (115.4 kg), SpO2 96 %. controlled      4. Prediabetes - watch sugar and carb intake . Plan:   Amie Hernandez has a stable cardiac status. Cardiac test and lab results personally reviewed by me during this office visit and discussed. Increase Lipitor to 40 mg daily   Stress Myoview soon. Continue risk factor modifications- watch sugar and carb intake . Call for any change in symptoms. Return for regular follow up based on testing results. I appreciate the opportunity of cooperating in the care of this individual.    Roberto Gay. Pedro Cramer M.D., [facility] [place]    Patient's problem list, medications, allergies, past medical, surgical, social and family histories were reviewed and updated as appropriate. Scribe's attestation: This note was scribed in the presence of Dr. Pedro Cramer MD, by Wes Guy RN. The scribe's documentation has been prepared under my direction and personally reviewed by me in its entirety. I confirm that the note above accurately reflects all work, treatment, procedures, and medical decision making performed by me.

## 2020-06-09 ENCOUNTER — NURSE ONLY (OUTPATIENT)
Dept: CARDIOLOGY CLINIC | Age: 50
End: 2020-06-09

## 2020-06-30 ENCOUNTER — HOSPITAL ENCOUNTER (OUTPATIENT)
Dept: NON INVASIVE DIAGNOSTICS | Age: 50
Discharge: HOME | End: 2020-06-30
Payer: COMMERCIAL

## 2020-06-30 ENCOUNTER — OFFICE VISIT (OUTPATIENT)
Dept: CARDIOLOGY CLINIC | Age: 50
End: 2020-06-30

## 2020-06-30 VITALS
BODY MASS INDEX: 31.68 KG/M2 | OXYGEN SATURATION: 96 % | HEIGHT: 73 IN | WEIGHT: 239 LBS | SYSTOLIC BLOOD PRESSURE: 110 MMHG | DIASTOLIC BLOOD PRESSURE: 60 MMHG | HEART RATE: 72 BPM

## 2020-06-30 LAB
LV EF: 67 %
LVEF MODALITY: NORMAL

## 2020-06-30 PROCEDURE — 3430000000 HC RX DIAGNOSTIC RADIOPHARMACEUTICAL: Performed by: INTERNAL MEDICINE

## 2020-06-30 PROCEDURE — 78452 HT MUSCLE IMAGE SPECT MULT: CPT

## 2020-06-30 PROCEDURE — MISCLABM CINFIN LAB WORK PLUS PSA: Performed by: NURSE PRACTITIONER

## 2020-06-30 PROCEDURE — 93017 CV STRESS TEST TRACING ONLY: CPT | Performed by: INTERNAL MEDICINE

## 2020-06-30 PROCEDURE — 93015 CV STRESS TEST SUPVJ I&R: CPT | Performed by: INTERNAL MEDICINE

## 2020-06-30 PROCEDURE — A9502 TC99M TETROFOSMIN: HCPCS | Performed by: INTERNAL MEDICINE

## 2020-06-30 PROCEDURE — 99214 OFFICE O/P EST MOD 30 MIN: CPT | Performed by: NURSE PRACTITIONER

## 2020-06-30 RX ADMIN — TETROFOSMIN 30 MILLICURIE: 1.38 INJECTION, POWDER, LYOPHILIZED, FOR SOLUTION INTRAVENOUS at 10:51

## 2020-06-30 RX ADMIN — TETROFOSMIN 10 MILLICURIE: 1.38 INJECTION, POWDER, LYOPHILIZED, FOR SOLUTION INTRAVENOUS at 09:55

## 2020-06-30 NOTE — PROGRESS NOTES
Patient instructed on [facility] Protocol Stress Test Procedure including possible side effects and adverse reactions. Verbalizes knowledge and understanding and denies having any questions.

## 2020-06-30 NOTE — PROGRESS NOTES
lip    [employer] Annual Physical Exam        6/30/20    Jewel Christiansen 1970 44 y.o. Diagnosis Orders   1. Annual physical exam         Primary Prevention:   Wears seat belt: yes   Screening colonoscopy: has not scheduled a screening    Annual opthalmologic exam: Yes   Smoking: No   Diet: reduced carbohydrates ; eliminated sodas and drinks  oz water / day   Exercise: 15-20 min treadmill and uses his total gym   Dermatology exam: + eczema : next appt to be made is with derm   Flu-vaccination: '19   Sunscreen: Yes    Secondary Prevention:   Antihypertensive medication    Statin     Current Outpatient Medications   Medication Sig Dispense Refill    cetirizine (ZYRTEC) 10 MG tablet Take 10 mg by mouth daily as needed for Allergies      atorvastatin (LIPITOR) 40 MG tablet TAKE 1 TABLET BY MOUTH EVERY DAY 90 tablet 1    losartan-hydrochlorothiazide (HYZAAR) 50-12.5 MG per tablet Take 1 tablet by mouth daily 90 tablet 3    aspirin EC 81 MG EC tablet Take 1 tablet by mouth daily 90 tablet 1     No current facility-administered medications for this visit. Review of Systems   Constitutional: Negative for appetite change, fatigue and expected weight change: 15# down / 1 month eliminating sweets / breads for a total of 30# off in 3 months. energy level improved with dietary changes  HENT: notices shine / halo at nighttime only ; hx of tinnitus . Eyes: Negative. Respiratory: Negative. Negative for chest tightness and shortness of breath. Cardiovascular: Negative. Transient aches/pains; - palpitations and leg swelling. Gastrointestinal: Negative. Negative for diarrhea and constipation. Genitourinary: Negative for urgency, frequency, hematuria and difficulty urinating. Musculoskeletal: Negative for myalgias, back pain, joint swelling and arthralgias; pain in heels after sitting for a long time. Has been flexing his foot which has helped.    Skin: Rt palm and arch of Rt foot :         Date:06/30/2020 10:50                                      Technique:      Gated       Imaging Results          Stress ejection      Ejection fraction:67 %      EDV :69 ml      ESV :23 ml      Stroke volume :46 ml      LV mass :118 gr       Medical History          Diagnosis Orders   1.  Annual physical exam   ~unremarkable         Assessment    Stable   myoview stress: normal perfusion ; 1 mm ST depression on EKG    ~d/w LES : no further testing needed at this time; will need routine follow ups with [facility] : FH CAD and positive calcium score   ~offers no c/o chest pain / SOB / decline in energy level     Plan  F/U with cardiology in six months   Follow up in one year for Cinti Fin evette Renteria, CNP

## 2020-07-01 ENCOUNTER — TELEPHONE (OUTPATIENT)
Dept: CARDIOLOGY CLINIC | Age: 50
End: 2020-07-01

## 2020-11-16 ENCOUNTER — OFFICE VISIT (OUTPATIENT)
Dept: CARDIOLOGY CLINIC | Age: 50
End: 2020-11-16
Payer: COMMERCIAL

## 2020-11-16 VITALS
SYSTOLIC BLOOD PRESSURE: 130 MMHG | DIASTOLIC BLOOD PRESSURE: 80 MMHG | WEIGHT: 244.2 LBS | HEART RATE: 80 BPM | HEIGHT: 73 IN | BODY MASS INDEX: 32.37 KG/M2

## 2020-11-16 PROCEDURE — 99214 OFFICE O/P EST MOD 30 MIN: CPT | Performed by: INTERNAL MEDICINE

## 2020-11-16 NOTE — PROGRESS NOTES
[facility]   Cardiac Follow Up     Referring Provider:  Liz Hendricks MD     Chief Complaint   Patient presents with    Hypertension    Hyperlipidemia        History of Present Illness:  Marciano Cotton is a 44 y.o. male with a history of hypertension and hyperlipidemia. Today he reports that since his last visit his brother who is 52, had bypass surgery. His sister required a stent. He reports a strong family history of CAD, father had double bypass in his 52's, his older brother quadruple bypass in his 52's. He has improved his diet, has reduced his sugar and carb intake- has lost weight. Past Medical History:   has a past medical history of Hyperlipidemia and Hypertension. Surgical History:   has a past surgical history that includes Tonsillectomy and adenoidectomy (1974). Social History:   reports that he has never smoked. He has never used smokeless tobacco. He reports current alcohol use. He reports that he does not use drugs. Family History:  family history includes Diabetes in an other family member; Heart Failure in his maternal grandmother; Heart Surgery in his brother and father; High Blood Pressure in his father; Migraines in his mother. Home Medications:  Prior to Admission medications    Medication Sig Start Date End Date Taking? Authorizing Provider   cetirizine (ZYRTEC) 10 MG tablet Take 10 mg by mouth daily as needed for Allergies   Yes Historical Provider, MD   atorvastatin (LIPITOR) 40 MG tablet TAKE 1 TABLET BY MOUTH EVERY DAY 6/8/20  Yes Shelton Heredia MD   losartan-hydrochlorothiazide [facility]) 50-12.5 MG per tablet Take 1 tablet by mouth daily 5/18/20  Yes Osbaldo Burnham MD   aspirin EC 81 MG EC tablet Take 1 tablet by mouth daily 4/7/20  Yes Lzi Hendricks MD        Allergies:  Lisinopril     Review of Systems:   · Constitutional: there has been no unanticipated weight loss. There's been no change in energy level, sleep pattern, or activity level. · Eyes: No visual changes or diplopia. No scleral icterus. · ENT: No Headaches, hearing loss or vertigo. No mouth sores or sore throat. · Cardiovascular: Reviewed in HPI  · Respiratory: No cough or wheezing, no sputum production. No hematemesis. · Gastrointestinal: No abdominal pain, appetite loss, blood in stools. No change in bowel or bladder habits. · Genitourinary: No dysuria, trouble voiding, or hematuria. · Musculoskeletal:  No gait disturbance, weakness or joint complaints. · Integumentary: No rash or pruritis. · Neurological: No headache, diplopia, change in muscle strength, numbness or tingling. No change in gait, balance, coordination, mood, affect, memory, mentation, behavior. · Psychiatric: No anxiety, no depression. · Endocrine: No malaise, fatigue or temperature intolerance. No excessive thirst, fluid intake, or urination. No tremor. · Hematologic/Lymphatic: No abnormal bruising or bleeding, blood clots or swollen lymph nodes. · Allergic/Immunologic: No nasal congestion or hives. Physical Examination:    Vitals:    11/16/20 1549   BP: 130/80   Pulse: 80        Wt Readings from Last 1 Encounters:   11/16/20 244 lb 3.2 oz (110.8 kg)       Constitutional and General Appearance: NAD  Skin:good turgor,intact without lesions  HEENT: EOMI ,normal  Neck:no JVD   Respiratory:  · Normal excursion and expansion without use of accessory muscles  · Resp Auscultation: Normal breath sounds without dullness  Cardiovascular:  · The apical impulses not displaced  · Heart tones are crisp and normal  · Cervical veins are not engorged  · The carotid upstroke is normal in amplitude and contour without delay or bruit  · Peripheral pulses are symmetrical and full  · There is no clubbing, cyanosis of the extremities.   · No edema  · Femoral Arteries: 2+ and equal  · Pedal Pulses: 2+ and equal   Abdomen:  · No masses or tenderness  · Liver/Spleen: No Abnormalities Noted  Neurological/Psychiatric:  · Alert and oriented in all spheres  · Moves all extremities well  · Exhibits normal gait balance and coordination  · No abnormalities of mood, affect, memory, mentation, or behavior are noted  6/30/20    Summary     Normal myocardial perfusion.     Normal LV size and systolic function.     Abnormal EKG response with 1 mm ST depression. Assessment:    1. Elevated coronary artery calcium score/Strong family history of early CAD   CT Calcium score 6/3/20> TOTAL AGATSTON CALCIUM SCORE: 938  Stress 6/30/20>  Normal myocardial perfusion. Normal LV size and systolic function.  Abnormal EKG response with 1 mm ST depression. Numerous family members with recently diagnosed CAD requiring bypass or stents  Plan for [facility]    2. Hyperlipidemia-Lipitor to 40mg daily    3. Essential hypertension -Blood pressure 130/80, pulse 80, height 6' 1\" (1.854 m), weight 244 lb 3.2 oz (110.8 kg). controlled      4. Prediabetes - watch sugar and carb intake . Plan:   Sukhdeep Beasley has a stable cardiac status. Cardiac test and lab results personally reviewed by me during this office visit and discussed. Plan For COVID Testing prior to [address] 11/24/20        Return for regular follow up based on testing results. I appreciate the opportunity of cooperating in the care of this individual.    Dulce Borrero. Slime Romeo M.D., [facility] [place]    Patient's problem list, medications, allergies, past medical, surgical, social and family histories were reviewed and updated as appropriate. Scribe's attestation: This note was scribed in the presence of Dr. Slime Romeo MD, by Lori Villegas RN. The scribe's documentation has been prepared under my direction and personally reviewed by me in its entirety. I confirm that the note above accurately reflects all work, treatment, procedures, and medical decision making performed by me.

## 2020-11-19 ENCOUNTER — OFFICE VISIT (OUTPATIENT)
Dept: PRIMARY CARE CLINIC | Age: 50
End: 2020-11-19
Payer: COMMERCIAL

## 2020-11-19 PROCEDURE — 99211 OFF/OP EST MAY X REQ PHY/QHP: CPT | Performed by: NURSE PRACTITIONER

## 2020-11-19 NOTE — PATIENT INSTRUCTIONS
You have received a viral test for COVID-19. Below is education on quarantine per the CDC guidelines. For any symptoms, seek care from your PCP, call [phone] to establish care with a doctor, or go directly to an urgent care or the emergency room. Test results will take 2-7 days and will be sent to you in your [employer] account. If you test positive, you will be contacted via phone. If you test negative, the ONLY communication will be through [address] GO TO [employer] AND SIGN UP FOR [employer]  (LOWER LEFT OF THE HOME PAGE)  No test is 100%. If you have symptoms, you should follow the guidance of quarantine as previously stated. You can still be contagious if you have symptoms. Your [facility] will reach out to you if you have a positive result. They will provide you with a return to work date and note. If you were tested for a pre-op, then you should remain in quarantine until your procedure. How do I know if I need to be in quarantine? If you live in a community where COVID-19 is or might be spreading (currently, that is virtually everywhere in the [place])  Be alert for symptoms. Watch for fever, cough, shortness of breath, or other symptoms of COVID-19.  Take your temperature if symptoms develop.  Practice social distancing. Maintain 6 feet of distance from others and stay out of crowded places.  Follow CDC guidance if symptoms develop. If you feel healthy but:   Recently had close contact with a person with COVID-19 you need to Quarantine:   Stay home until 14 days after your last exposure.  Check your temperature twice a day and watch for symptoms of COVID-19.  If possible, stay away from people who are at higher-risk for getting very sick from COVID-19.   Stay Home and Monitor Your Health if you:   Have been diagnosed with COVID-19, or   Are waiting for test results, or   Have cough, fever, or shortness of breath, or symptoms of COVID-19      When You Can

## 2020-11-19 NOTE — PROGRESS NOTES
Salvador Hankslois received a viral test for COVID-19. They were educated on isolation and quarantine as appropriate. For any symptoms, they were directed to seek care from their PCP, given contact information to establish with a doctor, directed to an urgent care or the emergency room.

## 2020-11-20 ENCOUNTER — TELEPHONE (OUTPATIENT)
Dept: CARDIOLOGY CLINIC | Age: 50
End: 2020-11-20

## 2020-11-20 LAB — SARS-COV-2: NOT DETECTED

## 2020-11-20 NOTE — TELEPHONE ENCOUNTER
Pt calling he has some questions about his procedure coming up on the 24th [facility] call to advise thank you

## 2020-11-27 ENCOUNTER — OFFICE VISIT (OUTPATIENT)
Dept: PRIMARY CARE CLINIC | Age: 50
End: 2020-11-27
Payer: COMMERCIAL

## 2020-11-27 PROCEDURE — 99211 OFF/OP EST MAY X REQ PHY/QHP: CPT | Performed by: NURSE PRACTITIONER

## 2020-11-27 NOTE — PROGRESS NOTES
Ayana Rao received a viral test for COVID-19. They were educated on isolation and quarantine as appropriate. For any symptoms, they were directed to seek care from their PCP, given contact information to establish with a doctor, directed to an urgent care or the emergency room.

## 2020-11-27 NOTE — PATIENT INSTRUCTIONS

## 2020-11-28 LAB — SARS-COV-2: NOT DETECTED

## 2020-12-03 ENCOUNTER — TELEPHONE (OUTPATIENT)
Dept: CARDIOLOGY CLINIC | Age: 50
End: 2020-12-03

## 2020-12-03 ENCOUNTER — HOSPITAL ENCOUNTER (OUTPATIENT)
Dept: CARDIAC CATH/INVASIVE PROCEDURES | Age: 50
Discharge: HOME | End: 2020-12-03
Attending: INTERNAL MEDICINE | Admitting: INTERNAL MEDICINE
Payer: COMMERCIAL

## 2020-12-03 VITALS
WEIGHT: 244 LBS | SYSTOLIC BLOOD PRESSURE: 137 MMHG | HEIGHT: 73 IN | BODY MASS INDEX: 32.34 KG/M2 | DIASTOLIC BLOOD PRESSURE: 87 MMHG

## 2020-12-03 LAB
ANION GAP SERPL CALCULATED.3IONS-SCNC: 9 MMOL/L (ref 3–16)
BASOPHILS ABSOLUTE: 0.1 K/UL (ref 0–0.2)
BASOPHILS RELATIVE PERCENT: 1.2 %
BUN BLDV-MCNC: 22 MG/DL (ref 7–20)
CALCIUM SERPL-MCNC: 9.1 MG/DL (ref 8.3–10.6)
CHLORIDE BLD-SCNC: 105 MMOL/L (ref 99–110)
CO2: 25 MMOL/L (ref 21–32)
CREAT SERPL-MCNC: 1.3 MG/DL (ref 0.9–1.3)
EKG ATRIAL RATE: 72 BPM
EKG DIAGNOSIS: NORMAL
EKG P AXIS: 33 DEGREES
EKG P-R INTERVAL: 134 MS
EKG Q-T INTERVAL: 388 MS
EKG QRS DURATION: 86 MS
EKG QTC CALCULATION (BAZETT): 424 MS
EKG R AXIS: 9 DEGREES
EKG T AXIS: 20 DEGREES
EKG VENTRICULAR RATE: 72 BPM
EOSINOPHILS ABSOLUTE: 0.2 K/UL (ref 0–0.6)
EOSINOPHILS RELATIVE PERCENT: 4.4 %
GFR AFRICAN AMERICAN: >60
GFR NON-AFRICAN AMERICAN: 58
GLUCOSE BLD-MCNC: 121 MG/DL (ref 70–99)
HCT VFR BLD CALC: 50 % (ref 40.5–52.5)
HEMOGLOBIN: 16.7 G/DL (ref 13.5–17.5)
LEFT VENTRICULAR EJECTION FRACTION MODE: NORMAL
LV EF: 50 %
LYMPHOCYTES ABSOLUTE: 1.1 K/UL (ref 1–5.1)
LYMPHOCYTES RELATIVE PERCENT: 23.4 %
MCH RBC QN AUTO: 29.1 PG (ref 26–34)
MCHC RBC AUTO-ENTMCNC: 33.3 G/DL (ref 31–36)
MCV RBC AUTO: 87.3 FL (ref 80–100)
MONOCYTES ABSOLUTE: 0.5 K/UL (ref 0–1.3)
MONOCYTES RELATIVE PERCENT: 10.5 %
NEUTROPHILS ABSOLUTE: 2.9 K/UL (ref 1.7–7.7)
NEUTROPHILS RELATIVE PERCENT: 60.5 %
PDW BLD-RTO: 14.5 % (ref 12.4–15.4)
PLATELET # BLD: 159 K/UL (ref 135–450)
PMV BLD AUTO: 10 FL (ref 5–10.5)
POC ACT LR: 289 SEC
POTASSIUM SERPL-SCNC: 4 MMOL/L (ref 3.5–5.1)
RBC # BLD: 5.73 M/UL (ref 4.2–5.9)
SODIUM BLD-SCNC: 139 MMOL/L (ref 136–145)
WBC # BLD: 4.9 K/UL (ref 4–11)

## 2020-12-03 PROCEDURE — 93458 L HRT ARTERY/VENTRICLE ANGIO: CPT | Performed by: INTERNAL MEDICINE

## 2020-12-03 PROCEDURE — C1874 STENT, COATED/COV W/DEL SYS: HCPCS

## 2020-12-03 PROCEDURE — 93005 ELECTROCARDIOGRAM TRACING: CPT | Performed by: INTERNAL MEDICINE

## 2020-12-03 PROCEDURE — 92928 PRQ TCAT PLMT NTRAC ST 1 LES: CPT | Performed by: INTERNAL MEDICINE

## 2020-12-03 PROCEDURE — 6370000000 HC RX 637 (ALT 250 FOR IP)

## 2020-12-03 PROCEDURE — 93010 ELECTROCARDIOGRAM REPORT: CPT | Performed by: INTERNAL MEDICINE

## 2020-12-03 PROCEDURE — 80048 BASIC METABOLIC PNL TOTAL CA: CPT

## 2020-12-03 PROCEDURE — 99152 MOD SED SAME PHYS/QHP 5/>YRS: CPT

## 2020-12-03 PROCEDURE — C1725 CATH, TRANSLUMIN NON-LASER: HCPCS

## 2020-12-03 PROCEDURE — C1894 INTRO/SHEATH, NON-LASER: HCPCS

## 2020-12-03 PROCEDURE — 36415 COLL VENOUS BLD VENIPUNCTURE: CPT

## 2020-12-03 PROCEDURE — C1769 GUIDE WIRE: HCPCS

## 2020-12-03 PROCEDURE — 6360000004 HC RX CONTRAST MEDICATION: Performed by: INTERNAL MEDICINE

## 2020-12-03 PROCEDURE — 85347 COAGULATION TIME ACTIVATED: CPT

## 2020-12-03 PROCEDURE — C1887 CATHETER, GUIDING: HCPCS

## 2020-12-03 PROCEDURE — 2500000003 HC RX 250 WO HCPCS

## 2020-12-03 PROCEDURE — 92928 PRQ TCAT PLMT NTRAC ST 1 LES: CPT

## 2020-12-03 PROCEDURE — 99153 MOD SED SAME PHYS/QHP EA: CPT

## 2020-12-03 PROCEDURE — 93458 L HRT ARTERY/VENTRICLE ANGIO: CPT

## 2020-12-03 PROCEDURE — 85025 COMPLETE CBC W/AUTO DIFF WBC: CPT

## 2020-12-03 PROCEDURE — 6360000002 HC RX W HCPCS

## 2020-12-03 RX ORDER — CLOPIDOGREL BISULFATE 75 MG/1
75 TABLET ORAL DAILY
Qty: 90 TABLET | Refills: 1 | Status: SHIPPED | OUTPATIENT
Start: 2020-12-03 | End: 2021-05-27

## 2020-12-03 RX ADMIN — IOPAMIDOL 118 ML: 755 INJECTION, SOLUTION INTRAVENOUS at 08:54

## 2020-12-03 NOTE — TELEPHONE ENCOUNTER
Cath lab states pt needs heart cath fu and is needing 12/11/20. Asking if he can be worked in that day. Please call pt to schedule pt.

## 2020-12-03 NOTE — PRE SEDATION
Brief Pre-Op Note/Sedation Assessment      Tiara Long  1970  Cath/NONE      [id number]  7:13 AM    Planned Procedure: Cardiac Catheterization Procedure    Post Procedure Plan: Return to same level of care    Consent: I have discussed with the patient and/or the patient representative the indication, alternatives, and the possible risks and/or complications of the planned procedure and the anesthesia methods. The patient and/or patient representative appear to understand and agree to proceed. Chief Complaint: Dyspnea      Indications for Cath Procedure:  Suspected CAD  Anginal Classification within 2 weeks:  No symptoms  NYHA Heart Failure Class within 2 weeks: No symptoms  Is Cath Lab Visit Valve-related?: No  Surgical Risk: Low  Functional Type: >= 4 METS without symptoms    Anti- Anginal Meds within 2 weeks:   Yes: Aspirin and Statin (Any)    Stress or Imaging Studies Performed (within 6 months):  None     Vital Signs: There were no vitals taken for this visit. Allergies: Allergies   Allergen Reactions    Lisinopril Other (See Comments)     cough       Past Medical History:  Past Medical History:   Diagnosis Date    Hyperlipidemia     Hypertension          Surgical History:  Past Surgical History:   Procedure Laterality Date    TONSILLECTOMY AND ADENOIDECTOMY  1974         Medications:  No current facility-administered medications for this encounter. Pre-Sedation:    Pre-Sedation Documentation and Exam:  I have personally completed a history, physical exam & review of systems for this patient (see notes). Prior History of Anesthesia Complications:   none    Modified Mallampati:  II (soft palate, uvula, fauces visible)    ASA Classification:  Class 2 - A normal healthy patient with mild systemic disease      Kermit Scale:   Activity:  2 - Able to move 4 extremities voluntarily on command  Respiration:  2 - Able to breathe deeply and cough freely  Circulation:  2 - BP+/- 20mmHg of normal  Consciousness:  2 - Fully awake  Oxygen Saturation (color):  2 - Able to maintain oxygen saturation >92% on room air    Sedation/Anesthesia Plan:  Guard the patient's safety and welfare. Minimize physical discomfort and pain. Minimize negative psychological responses to treatment by providing sedation and analgesia and maximize the potential amnesia. Patient to meet pre-procedure discharge plan.     Medication Planned:  midazolam intravenously and fentanyl intravenously    Patient is an appropriate candidate for plan of sedation: yes      Electronically signed by Steffen Aguilar MD on 12/3/2020 at 7:13 AM

## 2020-12-03 NOTE — OP NOTE
Patient:  Marciano Cotton   :   1970    Procedural Summary  ~Consent:   Obtained written and verbal consent      Risks/benefits explained in detail  ~Procedure:    Left Heart Catheterization  ~Medications:    Procedural sedation with minimal conscious sedation  ~Complications:   None  ~Blood Loss:    <10cc  ~Specimens:    None obtained  ~Pre-sedation re-evaluation: Performed immediately prior to procedure. Medication and Procedural Reconciliation:  An independent trained observer pushed medications at my direction. We monitored the patient's level of consciousness and vital signs/physiologic status throughout the procedure duration (see start and stop times below). Sedation: 1 mg Versed, 75 mcg Fentanyl  Sedation start: 812  Sedation stop: 843    Cardiac Cath PCI:  Anatomy:   LAD-prox 90%, mid 80%, distal 80%  RCA prox 100%  RPDA L to R collaterals to RPDA      Intervention  ~Successful PCI to LAD with 4.0x8, 3.5x38 and 3.0x15 GUALBERTO. PD with 3.75x12 NC to 20atm. Excellent Result. Contrast: 118  Flouro Time: 12.3  Access: R radial a    Impression  ~Coronary Angiography w/ Severe 2VD    ~Successful complex angioplasty and stenting of LAD  ~Residual RCA         Recommendation  ~Aggressive medical treatment and risk factor modification  ~1. Stop heparin gtt. Post cath IVF. Bedrest.  2. Recommend beta blocker, high potency statin, aspirin and plavix for 6-12 months  3. Referral to cardiac rehab placed  4. Patient has been advised on the importance of regular exercise of at least 20-30 minutes daily. 5. Patient counseled about and offered assistance for smoking cessation   6. Follow up in 1-2 weeks with cardiology  7.  Consider  PCI to RCA if he has residual angina          Sarah Cowan MD 12/3/2020 8:56 AM

## 2020-12-03 NOTE — H&P
[facility]   Cardiac Follow Up     Referring Provider:  Alka Gutierrez MD     CC- markedly inceased calcium score     History of Present Illness:  Bernice Weldon is a 44 y.o. male with a history of hypertension and hyperlipidemia. Today he reports that since his last visit his brother who is 52, had bypass surgery. His sister required a stent. He reports a strong family history of CAD, father had double bypass in his 52's, his older brother quadruple bypass in his 52's. He has improved his diet, has reduced his sugar and carb intake- has lost weight. Past Medical History:   has a past medical history of Hyperlipidemia and Hypertension. Surgical History:   has a past surgical history that includes Tonsillectomy and adenoidectomy (1974). Social History:   reports that he has never smoked. He has never used smokeless tobacco. He reports current alcohol use. He reports that he does not use drugs. Family History:  family history includes Diabetes in an other family member; Heart Failure in his maternal grandmother; Heart Surgery in his brother and father; High Blood Pressure in his father; Migraines in his mother. Home Medications:  Prior to Admission medications    Medication Sig Start Date End Date Taking? Authorizing Provider   cetirizine (ZYRTEC) 10 MG tablet Take 10 mg by mouth daily as needed for Allergies    Historical Provider, MD   atorvastatin (LIPITOR) 40 MG tablet TAKE 1 TABLET BY MOUTH EVERY DAY 6/8/20   Nader Rodriguez MD   losartan-hydrochlorothiazide [facility]) 50-12.5 MG per tablet Take 1 tablet by mouth daily 5/18/20   Dav Philip MD   aspirin EC 81 MG EC tablet Take 1 tablet by mouth daily 4/7/20   Giovani Astorga MD        Allergies:  Lisinopril     Review of Systems:   · Constitutional: there has been no unanticipated weight loss. There's been no change in energy level, sleep pattern, or activity level. · Eyes: No visual changes or diplopia.  No scleral icterus. · ENT: No Headaches, hearing loss or vertigo. No mouth sores or sore throat. · Cardiovascular: Reviewed in HPI  · Respiratory: No cough or wheezing, no sputum production. No hematemesis. · Gastrointestinal: No abdominal pain, appetite loss, blood in stools. No change in bowel or bladder habits. · Genitourinary: No dysuria, trouble voiding, or hematuria. · Musculoskeletal:  No gait disturbance, weakness or joint complaints. · Integumentary: No rash or pruritis. · Neurological: No headache, diplopia, change in muscle strength, numbness or tingling. No change in gait, balance, coordination, mood, affect, memory, mentation, behavior. · Psychiatric: No anxiety, no depression. · Endocrine: No malaise, fatigue or temperature intolerance. No excessive thirst, fluid intake, or urination. No tremor. · Hematologic/Lymphatic: No abnormal bruising or bleeding, blood clots or swollen lymph nodes. · Allergic/Immunologic: No nasal congestion or hives. Physical Examination:    /80 p 70     Wt Readings from Last 1 Encounters:   11/16/20 244 lb 3.2 oz (110.8 kg)       Constitutional and General Appearance: NAD  Skin:good turgor,intact without lesions  HEENT: EOMI ,normal  Neck:no JVD   Respiratory:  · Normal excursion and expansion without use of accessory muscles  · Resp Auscultation: Normal breath sounds without dullness  Cardiovascular:  · The apical impulses not displaced  · Heart tones are crisp and normal  · Cervical veins are not engorged  · The carotid upstroke is normal in amplitude and contour without delay or bruit  · Peripheral pulses are symmetrical and full  · There is no clubbing, cyanosis of the extremities.   · No edema  · Femoral Arteries: 2+ and equal  · Pedal Pulses: 2+ and equal   Abdomen:  · No masses or tenderness  · Liver/Spleen: No Abnormalities Noted  Neurological/Psychiatric:  · Alert and oriented in all spheres  · Moves all extremities well  · Exhibits normal gait balance and coordination  · No abnormalities of mood, affect, memory, mentation, or behavior are noted  6/30/20    Summary     Normal myocardial perfusion.     Normal LV size and systolic function.     Abnormal EKG response with 1 mm ST depression. Assessment:    1. Elevated coronary artery calcium score/Strong family history of early CAD   CT Calcium score 6/3/20> TOTAL AGATSTON CALCIUM SCORE: 938  Stress 6/30/20>  Normal myocardial perfusion. Normal LV size and systolic function.  Abnormal EKG response with 1 mm ST depression. Numerous family members with recently diagnosed CAD requiring bypass or stents  Plan for [facility]    2. Hyperlipidemia-Lipitor to 40mg daily    3. Essential hypertension -controlled      4. Prediabetes - watch sugar and carb intake . Plan:   Marciano Cotton has a stable cardiac status. Cardiac test and lab results personally reviewed by me during this office visit and discussed. Plan For COVID Testing prior to [facility] today      Return for regular follow up based on testing results. I appreciate the opportunity of cooperating in the care of this individual.    Stephany Melara. Junior Ike M.D., [facility] [place]    Patient's problem list, medications, allergies, past medical, surgical, social and family histories were reviewed and updated as appropriate.

## 2020-12-03 NOTE — OP NOTE
Patient:  Augusto Harper   :   1970    A pre-sedation re-evaluation was performed immediately prior to beginning of  the procedure. Procedure: cor/LV  Medications: Procedural sedation with full conscious sedation  Complications: None  Estimated Blood Loss: <5 cc  Specimens: Were not obtained        [place] Medication and Procedural Reconciliation:  I agree that the documented medications and procedures performed are true. The medications were given under my order. The procedures were performed under my direct supervision. CPT Code: 54411 US guidance for vascular access  NOTE: Ultrasound access was used to access the Rt radial artery  using Seldinger technique after local infiltration of 1% lidocaine. Ultrasound documented/visualized  patency, pulsatility and exact location for puncture and determined ok to proceed. Image (s) was printed off [employer] and sent to medical records on a progress note. Conscious sedation start time: 737  Conscious sedation stop time: 811  Versed: 2 mg  Fentanyl: 100 Mcg  Bleeding risk: Low  LVEDP: 4 mmHg  AO: 94/63mmHg  Estimated blood loss: Less than 5 mL   Contrast: pending intervention  Fluoroscopy time: 4.5 min. With pending intervention  EF 50 with mild anterior hypokinesis  100% RCA  70% prox LAD,90% mid LAD,60-70% distal LAD  Collaterals Lft to right    Dr Claudean Schmid consulted for LAD PCI.  Findings reviewed with wife and patient

## 2020-12-04 ENCOUNTER — TELEPHONE (OUTPATIENT)
Dept: CARDIOLOGY CLINIC | Age: 50
End: 2020-12-04

## 2020-12-10 LAB
CHOLESTEROL, TOTAL: 135 MG/DL
CHOLESTEROL/HDL RATIO: 3.9
HDLC SERPL-MCNC: 35 MG/DL (ref 35–70)
LDL CHOLESTEROL CALCULATED: 80 MG/DL (ref 0–160)
NONHDLC SERPL-MCNC: NORMAL MG/DL
TRIGL SERPL-MCNC: 105 MG/DL
VLDLC SERPL CALC-MCNC: NORMAL MG/DL

## 2020-12-10 NOTE — PROGRESS NOTES
Allergies:  Lisinopril     Review of Systems:   · Constitutional: there has been no unanticipated weight loss. There's been no change in energy level, sleep pattern, or activity level. · Eyes: No visual changes or diplopia. No scleral icterus. · ENT: No Headaches, hearing loss or vertigo. No mouth sores or sore throat. · Cardiovascular: Reviewed in HPI  · Respiratory: No cough or wheezing, no sputum production. No hematemesis. · Gastrointestinal: No abdominal pain, appetite loss, blood in stools. No change in bowel or bladder habits. · Genitourinary: No dysuria, trouble voiding, or hematuria. · Musculoskeletal:  No gait disturbance, weakness or joint complaints. · Integumentary: No rash or pruritis. · Neurological: No headache, diplopia, change in muscle strength, numbness or tingling. No change in gait, balance, coordination, mood, affect, memory, mentation, behavior. · Psychiatric: No anxiety, no depression. · Endocrine: No malaise, fatigue or temperature intolerance. No excessive thirst, fluid intake, or urination. No tremor. · Hematologic/Lymphatic: No abnormal bruising or bleeding, blood clots or swollen lymph nodes. · Allergic/Immunologic: No nasal congestion or hives. Physical Examination:    Vitals:    12/11/20 1004   BP: 130/80   Pulse: 64        Wt Readings from Last 1 Encounters:   12/11/20 241 lb 8 oz (109.5 kg)       Constitutional and General Appearance: NAD  Skin:good turgor,intact without lesions  HEENT: EOMI ,normal  Neck:no JVD   Respiratory:  · Normal excursion and expansion without use of accessory muscles  · Resp Auscultation: Normal breath sounds without dullness  Cardiovascular:  · The apical impulses not displaced  · Heart tones are crisp and normal  · Cervical veins are not engorged  · The carotid upstroke is normal in amplitude and contour without delay or bruit  · Peripheral pulses are symmetrical and full  · There is no clubbing, cyanosis of the extremities.   · No edema  · Femoral Arteries: 2+ and equal  · Pedal Pulses: 2+ and equal   Abdomen:  · No masses or tenderness  · Liver/Spleen: No Abnormalities Noted  Neurological/Psychiatric:  · Alert and oriented in all spheres  · Moves all extremities well  · Exhibits normal gait balance and coordination  · No abnormalities of mood, affect, memory, mentation, or behavior are noted  6/30/20    Summary     Normal myocardial perfusion.     Normal LV size and systolic function.     Abnormal EKG response with 1 mm ST depression. Assessment:    1. CAD - no anginal symptoms   LHC  12/3/20> PCI to LAD with 4.0x8, 3.5x38 and 3.0x15 GUALBERTO. PD with 3.75x12 NC to 20atm. Excellent Result. Residual RCA   CT Calcium score 6/3/20> TOTAL AGATSTON CALCIUM SCORE: 938  Stress 6/30/20>  Normal myocardial perfusion. Normal LV size and systolic function.  Abnormal EKG response with 1 mm ST depression. Numerous family members with recently diagnosed CAD requiring bypass or stents  Pay close attention to EKG changes- can have stress testing yearly through his employer    2. Hyperlipidemia-Lipitor to 40mg daily - has been on this dose approx 1 year    3. Essential hypertension -Blood pressure 130/80, pulse 64, height 6' 1\" (1.854 m), weight 241 lb 8 oz (109.5 kg). controlled      4. Prediabetes - watch sugar and carb intake . Plan:  Pay close attention to EKG changes. Dena Calhoun has a stable cardiac status. Cardiac test and lab results personally reviewed by me during this office visit and discussed. No med changes. He had labs drawn this week- results not yet available- may need to increase Lipitor based on results    Continue risk factor modifications. Call for any change in symptoms, call to report any changes in shortness of breath or development of chest pain with activity. Return for regular follow up in 6 months. I appreciate the opportunity of cooperating in the care of this individual.    Jennifer Cummings.  Shantell Conti, M.D., [facility] [place]    Patient's problem list, medications, allergies, past medical, surgical, social and family histories were reviewed and updated as appropriate. Scribe's attestation: This note was scribed in the presence of Dr. Katarina Pedroza MD, by Arden Lazar RN. The scribe's documentation has been prepared under my direction and personally reviewed by me in its entirety. I confirm that the note above accurately reflects all work, treatment, procedures, and medical decision making performed by me.

## 2020-12-11 ENCOUNTER — OFFICE VISIT (OUTPATIENT)
Dept: CARDIOLOGY CLINIC | Age: 50
End: 2020-12-11
Payer: COMMERCIAL

## 2020-12-11 VITALS
HEART RATE: 64 BPM | DIASTOLIC BLOOD PRESSURE: 80 MMHG | HEIGHT: 73 IN | WEIGHT: 241.5 LBS | SYSTOLIC BLOOD PRESSURE: 130 MMHG | BODY MASS INDEX: 32.01 KG/M2

## 2020-12-11 LAB
AVERAGE GLUCOSE: NORMAL
HBA1C MFR BLD: 5.6 %

## 2020-12-11 PROCEDURE — 99214 OFFICE O/P EST MOD 30 MIN: CPT | Performed by: INTERNAL MEDICINE

## 2020-12-21 NOTE — TELEPHONE ENCOUNTER
12/11/2020 Ov with Dr Jerie Osgood:  Pay close attention to EKG changes. Jaimie Bhatti has a stable cardiac status. Cardiac test and lab results personally reviewed by me during this office visit and discussed. No med changes. He had labs drawn this week- results not yet available- may need to increase Lipitor based on results    Continue risk factor modifications. Call for any change in symptoms, call to report any changes in shortness of breath or development of chest pain with activity. Return for regular follow up in 6 months.

## 2020-12-22 RX ORDER — ATORVASTATIN CALCIUM 40 MG/1
TABLET, FILM COATED ORAL
Qty: 90 TABLET | Refills: 1 | Status: SHIPPED | OUTPATIENT
Start: 2020-12-22 | End: 2021-06-23

## 2021-04-08 ENCOUNTER — OFFICE VISIT (OUTPATIENT)
Dept: INTERNAL MEDICINE CLINIC | Age: 51
End: 2021-04-08
Payer: COMMERCIAL

## 2021-04-08 ENCOUNTER — HOSPITAL ENCOUNTER (OUTPATIENT)
Dept: GENERAL RADIOLOGY | Age: 51
Discharge: HOME | End: 2021-04-08
Payer: COMMERCIAL

## 2021-04-08 ENCOUNTER — HOSPITAL ENCOUNTER (OUTPATIENT)
Age: 51
Discharge: HOME | End: 2021-04-08
Payer: COMMERCIAL

## 2021-04-08 VITALS
SYSTOLIC BLOOD PRESSURE: 122 MMHG | WEIGHT: 251 LBS | HEIGHT: 73 IN | BODY MASS INDEX: 33.27 KG/M2 | HEART RATE: 60 BPM | DIASTOLIC BLOOD PRESSURE: 84 MMHG

## 2021-04-08 DIAGNOSIS — D22.9 ATYPICAL MOLE: ICD-10-CM

## 2021-04-08 DIAGNOSIS — L30.9 ECZEMA, UNSPECIFIED TYPE: ICD-10-CM

## 2021-04-08 DIAGNOSIS — G89.29 CHRONIC PAIN OF RIGHT KNEE: ICD-10-CM

## 2021-04-08 DIAGNOSIS — I25.118 CORONARY ARTERY DISEASE OF NATIVE ARTERY OF NATIVE HEART WITH STABLE ANGINA PECTORIS (HCC): ICD-10-CM

## 2021-04-08 DIAGNOSIS — Z00.00 ANNUAL PHYSICAL EXAM: Primary | ICD-10-CM

## 2021-04-08 DIAGNOSIS — I10 ESSENTIAL HYPERTENSION: ICD-10-CM

## 2021-04-08 DIAGNOSIS — M25.561 CHRONIC PAIN OF RIGHT KNEE: ICD-10-CM

## 2021-04-08 DIAGNOSIS — R73.03 PREDIABETES: ICD-10-CM

## 2021-04-08 DIAGNOSIS — Z12.11 SCREEN FOR COLON CANCER: ICD-10-CM

## 2021-04-08 PROCEDURE — 73562 X-RAY EXAM OF KNEE 3: CPT

## 2021-04-08 PROCEDURE — 99396 PREV VISIT EST AGE 40-64: CPT | Performed by: INTERNAL MEDICINE

## 2021-04-08 ASSESSMENT — ENCOUNTER SYMPTOMS
ABDOMINAL PAIN: 0
WHEEZING: 0
SHORTNESS OF BREATH: 0
NAUSEA: 0
TROUBLE SWALLOWING: 0
VOMITING: 0
VOICE CHANGE: 0

## 2021-04-08 ASSESSMENT — PATIENT HEALTH QUESTIONNAIRE - PHQ9
SUM OF ALL RESPONSES TO PHQ QUESTIONS 1-9: 0
SUM OF ALL RESPONSES TO PHQ QUESTIONS 1-9: 0
2. FEELING DOWN, DEPRESSED OR HOPELESS: 0
1. LITTLE INTEREST OR PLEASURE IN DOING THINGS: 0

## 2021-04-08 NOTE — PROGRESS NOTES
Jefry Abraham  1970  male  44 y.o. SUBJECTIVE:       Chief Complaint   Patient presents with    Annual Exam    Knee Pain     rt --unstable, continuously--DULL       HPI:  Patient wants to have yearly physical.  He does get periodic physical with [facility] cardiology. Since last visit he had a stent placement. He denies chest pain palpitation dizziness. He continue to follow-up with the cardiologist.  Patient needs to be physically active and to 3 months ago since then he started to have gradual onset of right knee pain. He described pain diffusely mostly on the anterior knee. He cannot recall any injury. He feels he is having weak knee, at times knee give out. He did not try any medication to relieve the symptoms. He is not up-to-date on colonoscopy. Patient already have appointment with dermatology Dr. Zenobia Barry    Past Medical History:   Diagnosis Date    Coronary artery disease involving native coronary artery of native heart with angina pectoris ([address]     ~Successful PCI to LAD with 4.0x8, 3.5x38 and 3.0x15 GUALBERTO. PD with 3.75x12 NC to 20atm.  Excellent Result    Hyperlipidemia     Hypertension      Past Surgical History:   Procedure Laterality Date   [address] [facility] [place]    [address] [place] [address]     Social History     Socioeconomic History    Marital status:      Spouse name: None    Number of children: 3    Years of education: None    Highest education level: None   Occupational History    Occupation:      Comment: [address] Financial resource strain: Not hard at all   [employer] insecurity     Worry: Never true     Inability: Never true    Transportation needs     Medical: No     Non-medical: No   Tobacco Use    Smoking status: Never Smoker    Smokeless tobacco: Never Used   Substance and Sexual Activity    Alcohol use: Yes     Comment: socially, varies  Drug use: No    Sexual activity: Yes     Partners: Female   Lifestyle    Physical activity     Days per week: None     Minutes per session: None    Stress: None   Relationships    Social connections     Talks on phone: None     Gets together: None     Attends Rastafarian service: None     Active member of club or organization: None     Attends meetings of clubs or organizations: None     Relationship status: None    Intimate partner violence     Fear of current or ex partner: None     Emotionally abused: None     Physically abused: None     Forced sexual activity: None   Other Topics Concern    None   Social History Narrative    None     Family History   Problem Relation Age of Onset    Migraines Mother     High Blood Pressure Father     Heart Surgery Father         cabg x3    Heart Failure Maternal Grandmother     Diabetes Other         aunt, niece    Heart Surgery Brother        Review of Systems   Constitutional: Negative for appetite change and unexpected weight change. HENT: Negative for trouble swallowing and voice change. Respiratory: Negative for shortness of breath and wheezing. Cardiovascular: Negative for chest pain and palpitations. Gastrointestinal: Negative for abdominal pain, nausea and vomiting. Neurological: Negative for dizziness and light-headedness. OBJECTIVE:  Pulse Readings from Last 4 Encounters:   04/08/21 60   12/11/20 64   11/16/20 80   06/30/20 72     Wt Readings from Last 4 Encounters:   04/08/21 251 lb (113.9 kg)   12/11/20 241 lb 8 oz (109.5 kg)   12/03/20 244 lb (110.7 kg)   11/16/20 244 lb 3.2 oz (110.8 kg)     BP Readings from Last 4 Encounters:   04/08/21 122/84   12/11/20 130/80   12/03/20 137/87   11/16/20 130/80     Physical Exam  Vitals signs and nursing note reviewed. Constitutional:       Appearance: Normal appearance. He is not ill-appearing. Eyes:      General: No scleral icterus.      Conjunctiva/sclera: Conjunctivae normal.   Neck: Vascular: No carotid bruit. Cardiovascular:      Rate and Rhythm: Normal rate and regular rhythm. Pulses: Normal pulses. Heart sounds: Normal heart sounds. Pulmonary:      Effort: Pulmonary effort is normal.      Breath sounds: Normal breath sounds. Abdominal:      General: Bowel sounds are normal.      Tenderness: There is no abdominal tenderness. There is no guarding. Musculoskeletal:      Right lower leg: No edema. Left lower leg: No edema. Comments: Examination of the right knee  Subjective diffuse tenderness anteriorly. No gross deformity. Negative patellar tap. Full weightbearing. Full range of motion. Skin:     Capillary Refill: Capillary refill takes less than 2 seconds. Comments: Atypical moles about 1.5 cm in diameter    Eczematoid lesion at the right palm of the hand   Neurological:      General: No focal deficit present. Mental Status: He is alert and oriented to person, place, and time.          CBC:   Lab Results   Component Value Date    WBC 4.9 12/03/2020    HGB 16.7 12/03/2020    HCT 50.0 12/03/2020     12/03/2020     CMP:  Lab Results   Component Value Date     12/03/2020    K 4.0 12/03/2020     12/03/2020    CO2 25 12/03/2020    ANIONGAP 9 12/03/2020    GLUCOSE 121 12/03/2020    BUN 22 12/03/2020    CREATININE 1.3 12/03/2020    GFRAA >60 12/03/2020    GFRAA >60 03/12/2010    CALCIUM 9.1 12/03/2020    PROT 7.0 06/20/2018    LABALBU 4.6 06/20/2018    AGRATIO 1.9 06/20/2018    BILITOT 1.0 06/20/2018    ALKPHOS 66 06/20/2018    ALT 30 06/20/2018    AST 18 06/20/2018    GLOB 2.4 06/20/2018     URINALYSIS:  Lab Results   Component Value Date    GLUCOSEU Negative 09/19/2017    GLUCOSEU NEGATIVE 03/12/2010    KETUA Negative 09/19/2017    SPECGRAV 1.011 09/19/2017    BLOODU Negative 09/19/2017    PHUR 6.0 09/19/2017    PROTEINU Negative 09/19/2017    NITRU Negative 09/19/2017    LEUKOCYTESUR Negative 09/19/2017    LABMICR Not Indicated 09/19/2017    URINETYPE Not Specified 09/19/2017     HBA1C:   Lab Results   Component Value Date    LABA1C 5.7 09/18/2018    .9 09/18/2018     MICRO/ALB:   Lab Results   Component Value Date    LABMICR Not Indicated 09/19/2017     LIPID:  Lab Results   Component Value Date    CHOL 190 06/20/2018    TRIG 171 06/20/2018    HDL 35 06/20/2018    HDL 39 03/12/2010    LDLCALC 121 06/20/2018    LABVLDL 34 06/20/2018     TSH:   Lab Results   Component Value Date    TSHREFLEX 1.85 01/25/2017     PSA:   Lab Results   Component Value Date    PSA 0.79 06/20/2018      Please see scanned labs. ASSESSMENT/PLAN:  Assessment/Plan:  Belem Coy was seen today for annual exam and knee pain. Diagnoses and all orders for this visit:    Annual physical exam  Annual PE/Wellness exam:  Discussed age appropriate preventive care including healthy diet, daily exercise, immunizations and age & gender guided screening tests. Essential hypertension  His last creatinine was 1.45  -     BASIC METABOLIC PANEL; Future  The current medical regimen is effective;  continue present plan and medications. Chronic pain of right knee  -     Cali Díaz MD, Orthopedic Surgery, [facility]  -     XR KNEE RIGHT (3 VIEWS); Future  Patient is advised to take over-the-counter Tylenol 500 mg 3 times daily.   Screen for colon cancer  -     AFL - Srinivas Gonzalez MD, Gastroenterology, [facility]    Atypical mole  Eczema, unspecified type    He has a follow-up appointment with dermatology next week-[facility]ology of [facility]l Or      Orders Placed This Encounter   Procedures    XR KNEE RIGHT (3 VIEWS)     Standing Status:   Future     Number of Occurrences:   1     Standing Expiration Date:   4/8/2022    BASIC METABOLIC PANEL     Standing Status:   Future     Number of Occurrences:   1     Standing Expiration Date:   4/8/2022    HEMOGLOBIN A1C     Standing Status:   Future     Standing Expiration Date:   4/8/2022   [facility] Lipid, Fasting     Standing Status:   Future     Standing Expiration Date:   4/8/2022   Suze Hay MD, Orthopedic Surgery, [facility]     Referral Priority:   Routine     Referral Type:   Eval and Treat     Referral Reason:   Specialty Services Required     Referred to Provider:   Nicolette Nguyen MD     Requested Specialty:   Orthopedic Surgery     Number of Visits Requested:   1    SALMA - Tesfaye Oh MD, Gastroenterology, [facility]     Referral Priority:   Routine     Referral Type:   Eval and Treat     Referral Reason:   Specialty Services Required     Referred to Provider:   Jin Landaverde MD     Requested Specialty:   Gastroenterology     Number of Visits Requested:   1     Current Outpatient Medications   Medication Sig Dispense Refill    atorvastatin (LIPITOR) 40 MG tablet TAKE 1 TABLET BY MOUTH EVERY DAY 90 tablet 1    metoprolol tartrate (LOPRESSOR) 25 MG tablet Take 1 tablet by mouth 2 times daily 180 tablet 1    clopidogrel (PLAVIX) 75 MG tablet Take 1 tablet by mouth daily 90 tablet 1    cetirizine (ZYRTEC) 10 MG tablet Take 10 mg by mouth daily as needed for Allergies      losartan-hydrochlorothiazide (HYZAAR) 50-12.5 MG per tablet Take 1 tablet by mouth daily 90 tablet 3    aspirin EC 81 MG EC tablet Take 1 tablet by mouth daily 90 tablet 1     No current facility-administered medications for this visit. Return in about 6 months (around 10/8/2021). An After Visit Summary was printed and given to the patient. Documentation was done using voice recognition dragon software. Every effort was made to ensure accuracy; however, inadvertent  Unintentional computerized transcription errors may be present.

## 2021-04-08 NOTE — PATIENT INSTRUCTIONS
Patient Education        Well Visit, Men 48 to 72: Care Instructions  Overview     Well visits can help you stay healthy. Your doctor has checked your overall health and may have suggested ways to take good care of yourself. Your doctor also may have recommended tests. At home, you can help prevent illness with healthy eating, regular exercise, and other steps. Follow-up care is a key part of your treatment and safety. Be sure to make and go to all appointments, and call your doctor if you are having problems. It's also a good idea to know your test results and keep a list of the medicines you take. How can you care for yourself at home? · Get screening tests that you and your doctor decide on. Screening helps find diseases before any symptoms appear. · Eat healthy foods. Choose fruits, vegetables, whole grains, protein, and low-fat dairy foods. Limit fat, especially saturated fat. Reduce salt in your diet. · Limit alcohol. Have no more than 2 drinks a day or 14 drinks a week. · Get at least 30 minutes of exercise on most days of the week. Walking is a good choice. You also may want to do other activities, such as running, swimming, cycling, or playing tennis or team sports. · Reach and stay at a healthy weight. This will lower your risk for many problems, such as obesity, diabetes, heart disease, and high blood pressure. · Do not smoke. Smoking can make health problems worse. If you need help quitting, talk to your doctor about stop-smoking programs and medicines. These can increase your chances of quitting for good. · Care for your mental health. It is easy to get weighed down by worry and stress. Learn strategies to manage stress, like deep breathing and mindfulness, and stay connected with your family and community. If you find you often feel sad or hopeless, talk with your doctor. Treatment can help.   · Talk to your doctor about whether you have any risk factors for sexually transmitted infections (STIs). You can help prevent STIs if you wait to have sex with a new partner (or partners) until you've each been tested for STIs. It also helps if you use condoms (male or female condoms) and if you limit your sex partners to one person who only has sex with you. Vaccines are available for some STIs. · If it's important to you to prevent pregnancy with your partner, talk with your doctor about birth control options that might be best for you. · If you think you may have a problem with alcohol or drug use, talk to your doctor. This includes prescription medicines (such as amphetamines and opioids) and illegal drugs (such as cocaine and methamphetamine). Your doctor can help you figure out what type of treatment is best for you. · Protect your skin from too much sun. When you're outdoors from 10 a.m. to 4 p.m., stay in the shade or cover up with clothing and a hat with a wide brim. Wear sunglasses that block UV rays. Even when it's cloudy, put broad-spectrum sunscreen (SPF 30 or higher) on any exposed skin. · See a dentist one or two times a year for checkups and to have your teeth cleaned. · Wear a seat belt in the car. When should you call for help? Watch closely for changes in your health, and be sure to contact your doctor if you have any problems or symptoms that concern you. Where can you learn more? Go to https://charu.[facility]. org and sign in to your [employer] account. Enter Z882 in the [facility] box to learn more about \"Well Visit, Men 48 to 72: Care Instructions. \"     If you do not have an account, please click on the \"Sign Up Now\" link. Current as of: May 27, 2020               Content Version: 12.8  © 2006-2021 [employer], [employer]. Care instructions adapted under license by [facility] ([facility]).  If you have questions about a medical condition or this instruction, always ask your healthcare professional. [address]  any warranty or liability for your use of this information.

## 2021-04-09 LAB
ANION GAP SERPL CALCULATED.3IONS-SCNC: 9 MMOL/L (ref 3–16)
BUN BLDV-MCNC: 23 MG/DL (ref 7–20)
CALCIUM SERPL-MCNC: 9.3 MG/DL (ref 8.3–10.6)
CHLORIDE BLD-SCNC: 102 MMOL/L (ref 99–110)
CO2: 28 MMOL/L (ref 21–32)
CREAT SERPL-MCNC: 1.3 MG/DL (ref 0.9–1.3)
GFR AFRICAN AMERICAN: >60
GFR NON-AFRICAN AMERICAN: 58
GLUCOSE BLD-MCNC: 90 MG/DL (ref 70–99)
POTASSIUM SERPL-SCNC: 3.9 MMOL/L (ref 3.5–5.1)
SODIUM BLD-SCNC: 139 MMOL/L (ref 136–145)

## 2021-04-16 ENCOUNTER — OFFICE VISIT (OUTPATIENT)
Dept: ORTHOPEDIC SURGERY | Age: 51
End: 2021-04-16
Payer: COMMERCIAL

## 2021-04-16 VITALS — BODY MASS INDEX: 33.46 KG/M2 | HEIGHT: 72 IN | WEIGHT: 247 LBS

## 2021-04-16 DIAGNOSIS — M25.561 CHRONIC PAIN OF RIGHT KNEE: ICD-10-CM

## 2021-04-16 DIAGNOSIS — S83.241A TEAR OF MEDIAL MENISCUS OF RIGHT KNEE, CURRENT, UNSPECIFIED TEAR TYPE, INITIAL ENCOUNTER: ICD-10-CM

## 2021-04-16 DIAGNOSIS — M17.11 PRIMARY OSTEOARTHRITIS OF RIGHT KNEE: Primary | ICD-10-CM

## 2021-04-16 DIAGNOSIS — G89.29 CHRONIC PAIN OF RIGHT KNEE: ICD-10-CM

## 2021-04-16 DIAGNOSIS — M23.51 CHRONIC INSTABILITY OF RIGHT KNEE: ICD-10-CM

## 2021-04-16 PROCEDURE — 20610 DRAIN/INJ JOINT/BURSA W/O US: CPT | Performed by: PHYSICIAN ASSISTANT

## 2021-04-16 PROCEDURE — 99203 OFFICE O/P NEW LOW 30 MIN: CPT | Performed by: PHYSICIAN ASSISTANT

## 2021-04-16 RX ORDER — LIDOCAINE HYDROCHLORIDE 10 MG/ML
5 INJECTION, SOLUTION INFILTRATION; PERINEURAL ONCE
Status: COMPLETED | OUTPATIENT
Start: 2021-04-16 | End: 2021-04-16

## 2021-04-16 RX ORDER — ACETAMINOPHEN 500 MG
500 TABLET ORAL EVERY 6 HOURS PRN
COMMUNITY

## 2021-04-16 RX ORDER — BETAMETHASONE SODIUM PHOSPHATE AND BETAMETHASONE ACETATE 3; 3 MG/ML; MG/ML
12 INJECTION, SUSPENSION INTRA-ARTICULAR; INTRALESIONAL; INTRAMUSCULAR; SOFT TISSUE ONCE
Status: COMPLETED | OUTPATIENT
Start: 2021-04-16 | End: 2021-04-16

## 2021-04-16 RX ADMIN — BETAMETHASONE SODIUM PHOSPHATE AND BETAMETHASONE ACETATE 12 MG: 3; 3 INJECTION, SUSPENSION INTRA-ARTICULAR; INTRALESIONAL; INTRAMUSCULAR; SOFT TISSUE at 09:56

## 2021-04-16 RX ADMIN — LIDOCAINE HYDROCHLORIDE 5 ML: 10 INJECTION, SOLUTION INFILTRATION; PERINEURAL at 09:57

## 2021-04-16 SDOH — HEALTH STABILITY: MENTAL HEALTH: HOW OFTEN DO YOU HAVE A DRINK CONTAINING ALCOHOL?: NOT ASKED

## 2021-04-16 NOTE — PATIENT INSTRUCTIONS
Patient Education        Meniscus Tear: Exercises  Introduction  Here are some examples of exercises for you to try. The exercises may be suggested for a condition or for rehabilitation. Start each exercise slowly. Ease off the exercises if you start to have pain. You will be told when to start these exercises and which ones will work best for you. How to do the exercises  Calf wall stretch   1. Stand facing a wall with your hands on the wall at about eye level. Put your affected leg about a step behind your other leg. 2. Keeping your back leg straight and your back heel on the floor, bend your front knee and gently bring your hip and chest toward the wall until you feel a stretch in the calf of your back leg. 3. Hold the stretch for at least 15 to 30 seconds. 4. Repeat 2 to 4 times. 5. Repeat steps 1 through 4, but this time keep your back knee bent. Hamstring wall stretch   1. Lie on your back in a doorway, with your good leg through the open door. 2. Slide your affected leg up the wall to straighten your knee. You should feel a gentle stretch down the back of your leg. 3. Hold the stretch for at least 1 minute. Then over time, try to lengthen the time you hold the stretch to as long as 6 minutes. 4. Repeat 2 to 4 times. 5. If you do not have a place to do this exercise in a doorway, there is another way to do it:  6. Lie on your back, and bend your affected leg. 7. Loop a towel under the ball and toes of that foot, and hold the ends of the towel in your hands. 8. Straighten your knee, and slowly pull back on the towel. You should feel a gentle stretch down the back of your leg. 9. Hold the stretch for at least 15 to 30 seconds. Or even better, hold the stretch for 1 minute if you can. 10. Repeat 2 to 4 times. 1. Do not arch your back. 2. Do not bend either knee. 3. Keep one heel touching the floor and the other heel touching the wall. Do not point your toes. Quad sets   1.  Sit with your leg straight and supported on the floor or a firm bed. (If you feel discomfort in the front or back of your knee, place a small towel roll under your knee.)  2. Tighten the muscles on top of your thigh by pressing the back of your knee flat down to the floor. (If you feel discomfort under your kneecap, place a small towel roll under your knee.)  3. Hold for about 6 seconds, then rest up to 10 seconds. 4. Do 8 to 12 repetitions several times a day. Straight-leg raises to the front   1. Lie on your back with your good knee bent so that your foot rests flat on the floor. Your injured leg should be straight. Make sure that your low back has a normal curve. You should be able to slip your flat hand in between the floor and the small of your back, with your palm touching the floor and your back touching the back of your hand. 2. Tighten the thigh muscles in the injured leg by pressing the back of your knee flat down to the floor. Hold your knee straight. 3. Keeping the thigh muscles tight, lift your injured leg up so that your heel is about 12 inches off the floor. Hold for 5 seconds and then lower slowly. 4. Do 8 to 12 repetitions. Straight-leg raises to the back   1. Lie on your stomach, and lift your leg straight up behind you (toward the ceiling). 2. Lift your toes about 6 inches off the floor, hold for about 6 seconds, then lower slowly. 3. Do 8 to 12 repetitions. Hamstring curls   1. Lie on your stomach with your knees straight. If your kneecap is uncomfortable, roll up a washcloth and put it under your leg just above your kneecap. 2. Lift the foot of your injured leg by bending the knee so that you bring the foot up toward your buttock. If this motion hurts, try it without bending your knee quite as far. This may help you avoid any painful motion. 3. Slowly lower your leg back to the floor. 4. Do 8 to 12 repetitions.   5. With permission from your doctor or physical therapist, you may also want to add a cuff weight to your ankle (not more than 5 pounds). With weight, you do not have to lift your leg more than 12 inches to get a hamstring workout. Wall slide with ball squeeze   1. Stand with your back against a wall and with your feet about shoulder-width apart. Your feet should be about 12 inches away from the wall. 2. Put a ball about the size of a soccer ball between your knees. Then slowly slide down the wall until your knees are bent about 20 to 30 degrees. 3. Tighten your thigh muscles by squeezing the ball between your knees. Hold that position for about 10 seconds, then stop squeezing. Rest for up to 10 seconds between repetitions. 4. Repeat 8 to 12 times. Heel raises   1. Stand with your feet a few inches apart, with your hands lightly resting on a counter or chair in front of you. 2. Slowly raise your heels off the floor while keeping your knees straight. 3. Hold for about 6 seconds, then slowly lower your heels to the floor. 4. Do 8 to 12 repetitions several times during the day. Heel dig bridging   Stop doing this exercise if it causes pain. 1. Lie on your back with both knees bent and your ankles bent so that only your heels are digging into the floor. Your knees should be bent about 90 degrees. 2. Then push your heels into the floor, squeeze your buttocks, and lift your hips off the floor until your shoulders, hips, and knees are all in a straight line. 3. Hold for about 6 seconds as you continue to breathe normally, and then slowly lower your hips back down to the floor and rest for up to 10 seconds. 4. Do 8 to 12 repetitions. Shallow standing knee bends   Do this exercise only if you have very little pain; if you have no clicking, locking, or giving way in the injured knee; and if it does not hurt while you are doing 8 to 12 repetitions. 1. Stand with your hands lightly resting on a counter or chair in front of you. Put your feet shoulder-width apart.   2. Slowly bend your knees so that you squat down like you are going to sit in a chair. Make sure your knees do not go in front of your toes. 3. Lower yourself about 6 inches. Your heels should remain on the floor at all times. 4. Rise slowly to a standing position. Follow-up care is a key part of your treatment and safety. Be sure to make and go to all appointments, and call your doctor if you are having problems. It's also a good idea to know your test results and keep a list of the medicines you take. Where can you learn more? Go to https://[employer]pepiceweb.[employer]. org and sign in to your [employer] account. Enter C183 in the [employer] box to learn more about \"Meniscus Tear: Exercises. \"     If you do not have an account, please click on the \"Sign Up Now\" link. Current as of: November 16, 2020               Content Version: 12.8  © 2006-2021 Healthwise, Incorporated. Care instructions adapted under license by [facility] ([facility]). If you have questions about a medical condition or this instruction, always ask your healthcare professional. [place] [address] any warranty or liability for your use of this information.

## 2021-04-16 NOTE — PROGRESS NOTES
Patient Name:Marques RAMOS [facility]  Medical Record Number: [id number]  YOB: 1970  Date of Encounter: 4/16/2021     Chief Complaint   Patient presents with    Knee Pain     Increased chronic Right knee pain and weakness, x3 mos. No known injury. History of Present Illness:  Paulina Rivera is a 44 y.o. male here for evaluation of his right knee. His pain assessment is documented below and I reviewed this with him today. Patient states for the last several years he has had episodes where he was going up or down stairs or carrying something heavy and he would have an acute right knee pain with occasional instability. Patient states this has worsened over the last 3 months. He has starting to have more knee pain with his normal everyday activities. He has been experiencing more right knee instability. He has been wearing a knee sleeve which she feels is helping. He denies significant swelling but has been icing. He denies redness or warmth of the right knee. Pain Assessment  Location of Pain: Knee  Location Modifiers: Right  Severity of Pain: 3  Quality of Pain: Buckling, Other (Comment), Sharp, Dull, Aching(Shooting)  Duration of Pain: Persistent  Frequency of Pain: Intermittent  Date Pain First Started: (chronic)  Aggravating Factors: Stairs, Other (Comment), Walking(Pivoting, Carrying things up the stairs)  Limiting Behavior: Yes  Relieving Factors: Other (Comment), Ice, Rest(Knee sleeve)  Result of Injury: No  Work-Related Injury: No  Are there other pain locations you wish to document?: No    Past Medical History:   Diagnosis Date    Coronary artery disease involving native coronary artery of native heart with angina pectoris ([address]     ~Successful PCI to LAD with 4.0x8, 3.5x38 and 3.0x15 GUALBERTO. PD with 3.75x12 NC to 20atm.  Excellent Result    Hyperlipidemia     Hypertension        Past Surgical History:   Procedure Laterality Date    CARDIAC CATHETERIZATION      CORONARY ANGIOPLASTY WITH Coty Stacey Madridodis Minor Hattie Meo    TONSILLECTOMY AND ADENOIDECTOMY  1974       Current Outpatient Medications   Medication Sig Dispense Refill    acetaminophen (TYLENOL) 500 MG tablet Take 500 mg by mouth every 6 hours as needed for Pain      atorvastatin (LIPITOR) 40 MG tablet TAKE 1 TABLET BY MOUTH EVERY DAY 90 tablet 1    metoprolol tartrate (LOPRESSOR) 25 MG tablet Take 1 tablet by mouth 2 times daily 180 tablet 1    clopidogrel (PLAVIX) 75 MG tablet Take 1 tablet by mouth daily 90 tablet 1    losartan-hydrochlorothiazide (HYZAAR) 50-12.5 MG per tablet Take 1 tablet by mouth daily 90 tablet 3    aspirin EC 81 MG EC tablet Take 1 tablet by mouth daily 90 tablet 1    cetirizine (ZYRTEC) 10 MG tablet Take 10 mg by mouth daily as needed for Allergies       Current Facility-Administered Medications   Medication Dose Route Frequency Provider Last Rate Last Admin    lidocaine 1 % injection 5 mL  5 mL Intra-articular Once Daniel Crape, PA-C        betamethasone acetate-betamethasone sodium phosphate (CELESTONE) injection 12 mg  12 mg Intra-articular Once Danielfrank Hazel PA-C         Allergies, social and family histories were reviewed and updated as appropriate. Review of Systems:  Relevant review of systems reviewed and available in the patient's chart under the 'MEDIA' tab. Vital Signs:  Ht 6' (1.829 m)   Wt 247 lb (112 kg)   BMI 33.50 kg/m²     General Exam:   Constitutional: Patient is adequately groomed with no evidence of malnutrition  Mental Status: The patient is oriented to time, place and person. The patient's mood and affect are appropriate. Lymphatic: The lymphatic examination bilaterally reveals all areas to be without enlargement or induration. Neurological: The patient has good coordination and balance. There is no focal weakness or sensory deficit. Right Knee Examination:    Inspection: Normal muscle contours and no significant limb length discrepancy.   No gross atrophy in any particular myotome. There is no obvious swelling or joint effusion of the knee. There are no abrasions, lacerations, contusions, hematomas or ecchymosis. There is no erythema, induration or warmth to suggest an infectious process. Palpation: Patient reports very minimal tenderness on palpation over the medial and lateral joint lines. There is really no patellofemoral crepitus. Range of Motion:    Flexion: 130°   Extension: 0°    Strength:  Quad 5 / 5  ; Hamstrings 5 / 5. Gross motor to hip and ankle intact    Special Tests:    Lachman (ACL) - negative   Posterior Lachman (PCL) - negative    Anterior Drawer (ACL) - negative   Posterior Drawer (PCL) - negative   Pivot shift (ACL) - negative    Posterior sag (PCL) - negative   Cody's Test (Meniscus) - negative   Homans Test (Thrombophlebitis)- negative   Does not open to valgus or varus stress at 0 or 30° (MCL/LCL)    Skin: There are no rashes, ulcerations or lesions. Sensation to light touch intact. Circulation: The limb is warm and well perfused. Distal pulses intact. Capillary refill is intact. Edema: none. Venous stasis changes: none. Gait: Patient with normal tandem gait without limp. Normal strides     Radiology:  X-rays obtained today and reviewed in office:  Views: 3 view right knee completed 4/8/2021 including AP, lateral and sunrise views. We completed bilateral AP, tunnel and sunrise views in office today. Impression: No evidence for acute fracture or subluxation / dislocation. Patient has a bipartite patella. There are no loose bodies appreciated. There is no evidence of tibiofemoral subluxation. Patient has moderate medial compartment arthritic changes. Impression:  Encounter Diagnoses   Name Primary?     Primary osteoarthritis of right knee Yes    Tear of medial meniscus of right knee, current, unspecified tear type, initial encounter     Chronic pain of right knee     Chronic instability of right knee        Office conservative treatment options and he elected to proceed with cortisone injection. This was completed as recorded above in the procedure note. He is given a handout of home exercises and stretches to start working on. He is advised to follow back in 3 to 4 weeks and if pain does not improve we could consider formal physical therapy. He might require advanced imaging in the future to further evaluate. Boydlois Perdomo was informed of the results of any imaging. We discussed treatment options and a time was given to answer questions. A plan was proposed and Juan Manuel Perdomo understand and accepts this course of care. Electronically signed by Lazara Renteria PA-C on 5/27/3355  Board Certified [facility]    Please note that portions of this note were completed with a voice recognition program.  Efforts were made to edit the dictations but occasionally words are mis-transcribed.

## 2021-05-13 RX ORDER — LOSARTAN POTASSIUM AND HYDROCHLOROTHIAZIDE 12.5; 5 MG/1; MG/1
1 TABLET ORAL DAILY
Qty: 90 TABLET | Refills: 3 | Status: SHIPPED | OUTPATIENT
Start: 2021-05-13 | End: 2022-05-09

## 2021-05-27 RX ORDER — CLOPIDOGREL BISULFATE 75 MG/1
TABLET ORAL
Qty: 90 TABLET | Refills: 1 | Status: SHIPPED | OUTPATIENT
Start: 2021-05-27 | End: 2021-12-03

## 2021-05-27 NOTE — TELEPHONE ENCOUNTER
Received refill request for clopidogrel, metoprolol from [facility] pharmacy.     Last ov: 12/11/2020 LES    Last Refill: 12/3/2020 #90 with 1 refill clopidogrel, #180 with 1 refill metoprolol    Next appointment:6/17/2021 KATARZYNA, 6/8/2021 MOE

## 2021-06-02 ENCOUNTER — NURSE ONLY (OUTPATIENT)
Dept: CARDIOLOGY CLINIC | Age: 51
End: 2021-06-02

## 2021-06-02 DIAGNOSIS — Z00.00 ANNUAL PHYSICAL EXAM: Primary | ICD-10-CM

## 2021-06-02 LAB
A/G RATIO: 2.1 (ref 1.1–2.2)
ALBUMIN SERPL-MCNC: 4.6 G/DL (ref 3.4–5)
ALP BLD-CCNC: 54 U/L (ref 40–129)
ALT SERPL-CCNC: 25 U/L (ref 10–40)
ANION GAP SERPL CALCULATED.3IONS-SCNC: 14 MMOL/L (ref 3–16)
AST SERPL-CCNC: 17 U/L (ref 15–37)
BASOPHILS ABSOLUTE: 0.1 K/UL (ref 0–0.2)
BASOPHILS RELATIVE PERCENT: 1.1 %
BILIRUB SERPL-MCNC: 0.7 MG/DL (ref 0–1)
BILIRUBIN DIRECT: <0.2 MG/DL (ref 0–0.3)
BILIRUBIN, INDIRECT: NORMAL MG/DL (ref 0–1)
BUN BLDV-MCNC: 13 MG/DL (ref 7–20)
CALCIUM SERPL-MCNC: 9.2 MG/DL (ref 8.3–10.6)
CHLORIDE BLD-SCNC: 104 MMOL/L (ref 99–110)
CHOLESTEROL, TOTAL: 187 MG/DL (ref 0–199)
CO2: 25 MMOL/L (ref 21–32)
CREAT SERPL-MCNC: 1.3 MG/DL (ref 0.9–1.3)
EOSINOPHILS ABSOLUTE: 0.3 K/UL (ref 0–0.6)
EOSINOPHILS RELATIVE PERCENT: 5.8 %
GFR AFRICAN AMERICAN: >60
GFR NON-AFRICAN AMERICAN: 58
GLOBULIN: 2.2 G/DL
GLUCOSE BLD-MCNC: 106 MG/DL (ref 70–99)
HCT VFR BLD CALC: 50.4 % (ref 40.5–52.5)
HDLC SERPL-MCNC: 35 MG/DL (ref 40–60)
HEMOGLOBIN: 16.8 G/DL (ref 13.5–17.5)
HEPATITIS C ANTIBODY INTERPRETATION: NORMAL
LDL CHOLESTEROL CALCULATED: 121 MG/DL
LYMPHOCYTES ABSOLUTE: 1.3 K/UL (ref 1–5.1)
LYMPHOCYTES RELATIVE PERCENT: 23.9 %
MCH RBC QN AUTO: 29.2 PG (ref 26–34)
MCHC RBC AUTO-ENTMCNC: 33.3 G/DL (ref 31–36)
MCV RBC AUTO: 87.6 FL (ref 80–100)
MONOCYTES ABSOLUTE: 0.5 K/UL (ref 0–1.3)
MONOCYTES RELATIVE PERCENT: 9 %
NEUTROPHILS ABSOLUTE: 3.2 K/UL (ref 1.7–7.7)
NEUTROPHILS RELATIVE PERCENT: 60.2 %
PDW BLD-RTO: 14.7 % (ref 12.4–15.4)
PLATELET # BLD: 161 K/UL (ref 135–450)
PMV BLD AUTO: 10.2 FL (ref 5–10.5)
POTASSIUM SERPL-SCNC: 4.8 MMOL/L (ref 3.5–5.1)
PROSTATE SPECIFIC ANTIGEN: 0.67 NG/ML (ref 0–4)
RBC # BLD: 5.75 M/UL (ref 4.2–5.9)
SODIUM BLD-SCNC: 143 MMOL/L (ref 136–145)
T3 TOTAL: 1.23 NG/ML (ref 0.8–2)
T4 TOTAL: 7.6 UG/DL (ref 4.5–10.9)
TOTAL PROTEIN: 6.8 G/DL (ref 6.4–8.2)
TRIGL SERPL-MCNC: 155 MG/DL (ref 0–150)
VLDLC SERPL CALC-MCNC: 31 MG/DL
WBC # BLD: 5.3 K/UL (ref 4–11)

## 2021-06-02 PROCEDURE — 36415 COLL VENOUS BLD VENIPUNCTURE: CPT | Performed by: NURSE PRACTITIONER

## 2021-06-02 NOTE — PROGRESS NOTES
Patient came in for annual physical labs and urine dip. Labs drawn (3 SST and 1 Lavender tube), Urine dip completed and resulted. Blood work sent to lab.

## 2021-06-08 ENCOUNTER — OFFICE VISIT (OUTPATIENT)
Dept: CARDIOLOGY CLINIC | Age: 51
End: 2021-06-08

## 2021-06-08 ENCOUNTER — HOSPITAL ENCOUNTER (OUTPATIENT)
Dept: NON INVASIVE DIAGNOSTICS | Age: 51
Discharge: HOME | End: 2021-06-08

## 2021-06-08 VITALS
OXYGEN SATURATION: 97 % | SYSTOLIC BLOOD PRESSURE: 120 MMHG | HEART RATE: 55 BPM | HEIGHT: 72 IN | DIASTOLIC BLOOD PRESSURE: 80 MMHG | WEIGHT: 250 LBS | BODY MASS INDEX: 33.86 KG/M2

## 2021-06-08 DIAGNOSIS — E78.2 MIXED HYPERLIPIDEMIA: ICD-10-CM

## 2021-06-08 DIAGNOSIS — I10 ESSENTIAL HYPERTENSION: ICD-10-CM

## 2021-06-08 DIAGNOSIS — I25.118 CORONARY ARTERY DISEASE OF NATIVE ARTERY OF NATIVE HEART WITH STABLE ANGINA PECTORIS (HCC): ICD-10-CM

## 2021-06-08 DIAGNOSIS — Z00.00 ANNUAL PHYSICAL EXAM: Primary | ICD-10-CM

## 2021-06-08 PROCEDURE — MISCLABM CINFIN LAB WORK PLUS PSA: Performed by: NURSE PRACTITIONER

## 2021-06-08 PROCEDURE — 93015 CV STRESS TEST SUPVJ I&R: CPT | Performed by: INTERNAL MEDICINE

## 2021-06-08 PROCEDURE — 99214 OFFICE O/P EST MOD 30 MIN: CPT | Performed by: NURSE PRACTITIONER

## 2021-06-08 PROCEDURE — 93017 CV STRESS TEST TRACING ONLY: CPT | Performed by: INTERNAL MEDICINE

## 2021-06-08 NOTE — PROGRESS NOTES
[address] Annual Physical Exam    6/8/21  Paulina Rivera   male   1970, 46 y.o. Diagnosis Orders   1. Annual physical exam     2. Coronary artery disease of native artery of native heart with stable angina pectoris ([address]     3. Mixed hyperlipidemia     4. Essential hypertension         Primary Prevention:   Wears seat belt: yes   Smoking: never   Diet: Regular    Exercise: limited by R knee pain. Not exercising. Flu-vaccination: yes   Pneumococcal vaccine: n/a   Shingles vaccine: no   Tetanus vaccine: yes    Sunscreen: yes   COVID vaccine: yes    Secondary Prevention:   Screening colonoscopy: plans to soon   Annual opthalmologic exam: annually, last being Jan of this year. Wears glasses. Dermatology exam: last months. Mole removed, benign. Current Outpatient Medications   Medication Sig Dispense Refill    metoprolol tartrate (LOPRESSOR) 25 MG tablet TAKE 1 TABLET BY MOUTH TWICE A  tablet 1    clopidogrel (PLAVIX) 75 MG tablet TAKE 1 TABLET BY MOUTH EVERY DAY 90 tablet 1    losartan-hydroCHLOROthiazide (HYZAAR) 50-12.5 MG per tablet Take 1 tablet by mouth daily 90 tablet 3    acetaminophen (TYLENOL) 500 MG tablet Take 500 mg by mouth every 6 hours as needed for Pain      atorvastatin (LIPITOR) 40 MG tablet TAKE 1 TABLET BY MOUTH EVERY DAY 90 tablet 1    cetirizine (ZYRTEC) 10 MG tablet Take 10 mg by mouth daily as needed for Allergies      aspirin EC 81 MG EC tablet Take 1 tablet by mouth daily 90 tablet 1     No current facility-administered medications for this visit.        Family History   Problem Relation Age of Onset   [facility] Migraines Mother     High Blood Pressure Father     Heart Surgery Father         cabg x3    Heart Surgery Sister         stents    Heart Surgery Brother         CABG x4    Heart Surgery Brother         CABG x5    Heart Failure Maternal Grandmother     Diabetes Other         aunt, niece        Social History     Tobacco Use    Smoking status: Never Smoker    Smokeless tobacco: Never Used   Vaping Use    Vaping Use: Never used   Substance Use Topics    Alcohol use: Yes     Comment: socially, 1 drink of beer monthly    Drug use: No        Review of Systems   Constitutional: Negative for appetite change, fatigue and unexpected weight change. HENT: Negative. Wears glasses. Eyes: Negative. Respiratory: Negative. Negative for chest tightness and shortness of breath. Cardiovascular: Negative. Negative for chest pain, palpitations and leg swelling. Gastrointestinal: Negative. Negative for diarrhea and constipation. Genitourinary: Negative for urgency, frequency, hematuria and difficulty urinating. Musculoskeletal: Negative for myalgias, back pain, joint swelling. Arthritic right knee pain. Skin: Negative. Neurological: Negative for dizziness, syncope, weakness, light-headedness, numbness and headaches. Hematological: Negative. Psychiatric/Behavioral: Negative. The patient is not nervous/anxious. Physical Exam     /80 (Site: Left Upper Arm, Position: Sitting, Cuff Size: Large Adult)   Pulse 55   Ht 6' (1.829 m)   Wt 250 lb (113.4 kg)   SpO2 97%   BMI 33.91 kg/m²     Constitutional: He is oriented to person, place, and time. He appears well-developed and well-nourished. HENT: external R ear canals patent, left canal blocked with cerumen; TM without injection, normal cone of light; oral mucosa moist/pink, uvula midline  Head: Normocephalic. Eyes: Pupils are equal, round, and reactive to light; gross fundoscopy exam normal   Neck: Neck supple. No JVD present. No tracheal deviation present. No thyromegaly present. Cardiovascular: Normal rate, regular rhythm and intact distal pulses. PMI is not displaced. No murmur heard. Pulmonary/Chest: Effort normal and breath sounds normal. He has no wheezes. He has no rales. He exhibits no tenderness. Abdominal: Soft.  Bowel sounds are normal. He exhibits no distension and no mass. No tenderness. Musculoskeletal: Normal range of motion. He exhibits no edema and no tenderness. Lymphadenopathy: He has no cervical/axilla adenopathy. Neurological: He is alert and oriented to person, place, and time. No cranial nerve deficit. Coordination normal.   Skin: Skin is warm and dry. No rash noted. No erythema. Psychiatric: He has a normal mood and affect. His behavior is normal. Judgment and thought content normal.       Lab Results   Component Value Date    CHOL 187 06/02/2021    CHOL 135 12/10/2020    CHOL 190 06/20/2018     Lab Results   Component Value Date    TRIG 155 (H) 06/02/2021    TRIG 105 12/10/2020    TRIG 171 (H) 06/20/2018     Lab Results   Component Value Date    HDL 35 (L) 06/02/2021    HDL 35 12/10/2020    HDL 35 (L) 06/20/2018     Lab Results   Component Value Date    LDLCALC 121 (H) 06/02/2021    LDLCALC 80 12/10/2020    LDLCALC 121 (H) 06/20/2018     Lab Results   Component Value Date    LABVLDL 31 06/02/2021    LABVLDL 34 06/20/2018    LABVLDL 29 03/12/2010     Lab Results   Component Value Date    CHOLHDLRATIO 3.9 12/10/2020     Lab Results   Component Value Date     06/02/2021    K 4.8 06/02/2021     06/02/2021    CO2 25 06/02/2021    BUN 13 06/02/2021    CREATININE 1.3 06/02/2021    GLUCOSE 106 06/02/2021    CALCIUM 9.2 06/02/2021      Lab Results   Component Value Date    WBC 5.3 06/02/2021    HGB 16.8 06/02/2021    HCT 50.4 06/02/2021    MCV 87.6 06/02/2021     06/02/2021     Lab Results   Component Value Date    P0OODFH 1.23 06/02/2021    H6NNDXY 7.6 06/02/2021     Lab Results   Component Value Date    PSA 0.67 06/02/2021    PSA 0.79 06/20/2018     Lab Results   Component Value Date    ALT 25 06/02/2021    AST 17 06/02/2021    ALKPHOS 54 06/02/2021    BILITOT 0.7 06/02/2021       Urinalysis: negative    GXT:   Summary   Normal treadmill exercise test.      Assessment & Plan:   Diagnosis Orders   1.  Annual physical exam   ~ Stable  ~ GXT: Normal treadmill stress test     2. Coronary artery disease of native artery of native heart with stable angina pectoris ([address]   ~hx of PCI of LAD 12/2020    3. Mixed hyperlipidemia   ~suboptimal LDL at 121  ~pt admits to not taking his lipitor regularly as he has trouble remembering his PM medications   ~Try to eat a plant-based or Mediterranean-like diet that is high in vegetables, fruits, nuts, lean meats (pereferabley fish). Stay away from processed foods. The goal is to do sustained cardiovascular exercise for 30 minutes most days of the week. 4. Essential hypertension   ~controlled         Plan    Very important to be compliant with lipitor    Increase cardiovascular exercise   Follow up in one year for [place] foy        Thank you for allowing me to participate in the care of Anthony Golddeidre.   Jose Armando Coburn, APRN-CNP

## 2021-06-08 NOTE — PATIENT INSTRUCTIONS
Debrox for for ear wax     Try to eat a plant-based or Mediterranean-like diet that is high in vegetables, fruits, nuts, lean meats (pereferabley fish). Stay away from processed foods. The goal is to do sustained cardiovascular exercise for 30 minutes most days of the week. Start out slow.

## 2021-06-08 NOTE — PROGRESS NOTES
Patient instructed on Vernon Protocol Stress Test Procedure including possible side effects and adverse reactions. Verbalizes knowledge and understanding and denies having any questions. Patient unable to reach target heart on treadmill with Vernon Protocol and unable to go any further or any faster per Patient. Pt achieved max heart rate of 116 in 10:35. Pt stopped per request for fatigue.

## 2021-06-15 NOTE — PROGRESS NOTES
cetirizine (ZYRTEC) 10 MG tablet Take 10 mg by mouth daily as needed for Allergies    Historical Provider, MD   aspirin EC 81 MG EC tablet Take 1 tablet by mouth daily 4/7/20   Leticia Cutler MD        Allergies:  Lisinopril     Review of Systems:   · Constitutional: there has been no unanticipated weight loss. There's been no change in energy level, sleep pattern, or activity level. · Eyes: No visual changes or diplopia. No scleral icterus. · ENT: No Headaches, hearing loss or vertigo. No mouth sores or sore throat. · Cardiovascular: Reviewed in HPI  · Respiratory: No cough or wheezing, no sputum production. No hematemesis. · Gastrointestinal: No abdominal pain, appetite loss, blood in stools. No change in bowel or bladder habits. · Genitourinary: No dysuria, trouble voiding, or hematuria. · Musculoskeletal:  No gait disturbance, weakness or joint complaints. · Integumentary: No rash or pruritis. · Neurological: No headache, diplopia, change in muscle strength, numbness or tingling. No change in gait, balance, coordination, mood, affect, memory, mentation, behavior. · Psychiatric: No anxiety, no depression. · Endocrine: No malaise, fatigue or temperature intolerance. No excessive thirst, fluid intake, or urination. No tremor. · Hematologic/Lymphatic: No abnormal bruising or bleeding, blood clots or swollen lymph nodes. · Allergic/Immunologic: No nasal congestion or hives. Physical Examination:    There were no vitals filed for this visit.      Wt Readings from Last 1 Encounters:   06/08/21 250 lb (113.4 kg)       Constitutional and General Appearance: NAD  Skin:good turgor,intact without lesions  HEENT: EOMI ,normal  Neck:no JVD   Respiratory:  · Normal excursion and expansion without use of accessory muscles  · Resp Auscultation: Normal breath sounds without dullness  Cardiovascular:  · The apical impulses not displaced  · Heart tones are crisp and normal  · Cervical veins are not engorged  · regular follow up in 6 months. I appreciate the opportunity of cooperating in the care of this individual.    Janet Vanegas.  Roman Das M.D., [facility]

## 2021-06-17 ENCOUNTER — OFFICE VISIT (OUTPATIENT)
Dept: CARDIOLOGY CLINIC | Age: 51
End: 2021-06-17
Payer: COMMERCIAL

## 2021-06-17 VITALS
BODY MASS INDEX: 34.27 KG/M2 | DIASTOLIC BLOOD PRESSURE: 82 MMHG | HEART RATE: 60 BPM | SYSTOLIC BLOOD PRESSURE: 114 MMHG | OXYGEN SATURATION: 96 % | HEIGHT: 72 IN | WEIGHT: 253 LBS

## 2021-06-17 DIAGNOSIS — I25.118 CORONARY ARTERY DISEASE OF NATIVE ARTERY OF NATIVE HEART WITH STABLE ANGINA PECTORIS (HCC): Primary | ICD-10-CM

## 2021-06-17 DIAGNOSIS — E78.2 MIXED HYPERLIPIDEMIA: ICD-10-CM

## 2021-06-17 DIAGNOSIS — R73.03 PREDIABETES: ICD-10-CM

## 2021-06-17 DIAGNOSIS — I10 ESSENTIAL HYPERTENSION: ICD-10-CM

## 2021-06-17 PROCEDURE — 99214 OFFICE O/P EST MOD 30 MIN: CPT | Performed by: INTERNAL MEDICINE

## 2021-06-17 RX ORDER — AMOXICILLIN 500 MG/1
CAPSULE ORAL
COMMUNITY
Start: 2021-05-25

## 2021-06-17 NOTE — PROGRESS NOTES
[address]   Cardiac Follow Up     Referring Provider:  Dylan Amador MD     Chief Complaint   Patient presents with    6 Month Follow-Up    Coronary Artery Disease    Hypertension        History of Present Illness:  Imelda Shea is a 46 y.o. male with a history CAD s/p PCI  to LAD (12/3/20), hypertension and hyperlipidemia. Strong family history of heart disease. Today  He reports he is feeling well, he has returned to walking for exercise. He has been working on his diet. He reports at the time of his labs he was not taking his statin regularly. He will have a health screening though his employer in December. Past Medical History:    has a past medical history of Coronary artery disease involving native coronary artery of native heart with angina pectoris ([address] Hyperlipidemia, and Hypertension. Surgical History:   has a past surgical history that includes Tonsillectomy and adenoidectomy (1974); Cardiac catheterization; and Coronary angioplasty with stent. Social History:   reports that he has never smoked. He has never used smokeless tobacco. He reports current alcohol use. He reports that he does not use drugs. Family History:  family history includes Diabetes in an other family member; Heart Failure in his maternal grandmother; Heart Surgery in his brother, brother, father, and sister; High Blood Pressure in his father; Migraines in his mother. Home Medications:  Prior to Admission medications    Medication Sig Start Date End Date Taking?  Authorizing Provider   amoxicillin (AMOXIL) 500 MG capsule  5/25/21  Yes Historical Provider, MD   metoprolol tartrate (LOPRESSOR) 25 MG tablet TAKE 1 TABLET BY MOUTH TWICE A DAY 5/27/21  Yes Vernon Myers MD   clopidogrel (PLAVIX) 75 MG tablet TAKE 1 TABLET BY MOUTH EVERY DAY 5/27/21  Yes Vernon Myers MD   losartan-hydroCHLOROthiazide [facility]) 50-12.5 MG per tablet Take 1 tablet by mouth daily 5/13/21  Yes Dylan Amador MD acetaminophen (TYLENOL) 500 MG tablet Take 500 mg by mouth every 6 hours as needed for Pain   Yes Historical Provider, MD   atorvastatin (LIPITOR) 40 MG tablet TAKE 1 TABLET BY MOUTH EVERY DAY 12/22/20  Yes Sara Francois MD   aspirin EC 81 MG EC tablet Take 1 tablet by mouth daily 4/7/20  Yes Ashley Oseguera MD        Allergies:  Lisinopril     Review of Systems:   · Constitutional: there has been no unanticipated weight loss. There's been no change in energy level, sleep pattern, or activity level. · Eyes: No visual changes or diplopia. No scleral icterus. · ENT: No Headaches, hearing loss or vertigo. No mouth sores or sore throat. · Cardiovascular: Reviewed in HPI  · Respiratory: No cough or wheezing, no sputum production. No hematemesis. · Gastrointestinal: No abdominal pain, appetite loss, blood in stools. No change in bowel or bladder habits. · Genitourinary: No dysuria, trouble voiding, or hematuria. · Musculoskeletal:  No gait disturbance, weakness or joint complaints. · Integumentary: No rash or pruritis. · Neurological: No headache, diplopia, change in muscle strength, numbness or tingling. No change in gait, balance, coordination, mood, affect, memory, mentation, behavior. · Psychiatric: No anxiety, no depression. · Endocrine: No malaise, fatigue or temperature intolerance. No excessive thirst, fluid intake, or urination. No tremor. · Hematologic/Lymphatic: No abnormal bruising or bleeding, blood clots or swollen lymph nodes. · Allergic/Immunologic: No nasal congestion or hives.     Physical Examination:    Vitals:    06/17/21 0736   BP: 114/82   Pulse: 60   SpO2: 96%        Wt Readings from Last 1 Encounters:   06/17/21 253 lb (114.8 kg)       Constitutional and General Appearance: NAD  Skin:good turgor,intact without lesions  HEENT: EOMI ,normal  Neck:no JVD   Respiratory:  · Normal excursion and expansion without use of accessory muscles  · Resp Auscultation: Normal breath sounds without dullness  Cardiovascular:  · The apical impulses not displaced  · Heart tones are crisp and normal  · Cervical veins are not engorged  · The carotid upstroke is normal in amplitude and contour without delay or bruit  · Peripheral pulses are symmetrical and full  · There is no clubbing, cyanosis of the extremities. · No edema  · Femoral Arteries: 2+ and equal  · Pedal Pulses: 2+ and equal   Abdomen:  · No masses or tenderness  · Liver/Spleen: No Abnormalities Noted  Neurological/Psychiatric:  · Alert and oriented in all spheres  · Moves all extremities well  · Exhibits normal gait balance and coordination  · No abnormalities of mood, affect, memory, mentation, or behavior are noted  6/30/20    Summary     Normal myocardial perfusion.     Normal LV size and systolic function.     Abnormal EKG response with 1 mm ST depression. Assessment:    1. CAD - no anginal symptoms   [facility]  12/3/20> PCI to LAD with 4.0x8, 3.5x38 and 3.0x15 GUALBERTO. PD with 3.75x12 NC to 20atm. Excellent Result. Residual RCA   CT Calcium score 6/3/20> TOTAL [place] CALCIUM SCORE: 938  Stress 6/30/20>  Normal myocardial perfusion. Normal LV size and systolic function.  Abnormal EKG response with 1 mm ST depression. Numerous family members with recently diagnosed CAD requiring bypass or stents  Pay close attention to EKG changes- can have stress testing yearly through his employer    2. Hyperlipidemia-Lipitor to 40mg daily - resume statin -recheck on 6 months     3. Essential hypertension -Blood pressure 114/82, pulse 60, height 6' (1.829 m), weight 253 lb (114.8 kg), SpO2 96 %. controlled      4. Prediabetes - watch sugar and carb intake . Plan:  Pay close attention to EKG changes. Sebastián Medina has a stable cardiac status. Cardiac test and lab results personally reviewed by me during this office visit and discussed. No med changes.   Lipids in 6 months- reports work health screening in Dec    Continue risk factor

## 2021-06-23 DIAGNOSIS — E78.5 HYPERLIPIDEMIA, UNSPECIFIED HYPERLIPIDEMIA TYPE: ICD-10-CM

## 2021-06-23 RX ORDER — ATORVASTATIN CALCIUM 40 MG/1
TABLET, FILM COATED ORAL
Qty: 90 TABLET | Refills: 1 | Status: SHIPPED | OUTPATIENT
Start: 2021-06-23 | End: 2021-11-05 | Stop reason: SDUPTHER

## 2021-11-05 ENCOUNTER — OFFICE VISIT (OUTPATIENT)
Dept: INTERNAL MEDICINE CLINIC | Age: 51
End: 2021-11-05
Payer: COMMERCIAL

## 2021-11-05 VITALS
HEART RATE: 68 BPM | WEIGHT: 261.2 LBS | DIASTOLIC BLOOD PRESSURE: 82 MMHG | SYSTOLIC BLOOD PRESSURE: 122 MMHG | OXYGEN SATURATION: 97 % | BODY MASS INDEX: 35.43 KG/M2

## 2021-11-05 DIAGNOSIS — I10 ESSENTIAL HYPERTENSION: Primary | ICD-10-CM

## 2021-11-05 DIAGNOSIS — Z23 NEED FOR SHINGLES VACCINE: ICD-10-CM

## 2021-11-05 DIAGNOSIS — Z12.11 COLON CANCER SCREENING: ICD-10-CM

## 2021-11-05 DIAGNOSIS — E78.5 HYPERLIPIDEMIA, UNSPECIFIED HYPERLIPIDEMIA TYPE: ICD-10-CM

## 2021-11-05 DIAGNOSIS — Z23 NEED FOR PNEUMOCOCCAL VACCINATION: ICD-10-CM

## 2021-11-05 DIAGNOSIS — I25.118 CORONARY ARTERY DISEASE OF NATIVE ARTERY OF NATIVE HEART WITH STABLE ANGINA PECTORIS (HCC): ICD-10-CM

## 2021-11-05 PROCEDURE — 90750 HZV VACC RECOMBINANT IM: CPT | Performed by: INTERNAL MEDICINE

## 2021-11-05 PROCEDURE — 90732 PPSV23 VACC 2 YRS+ SUBQ/IM: CPT | Performed by: INTERNAL MEDICINE

## 2021-11-05 PROCEDURE — 90472 IMMUNIZATION ADMIN EACH ADD: CPT | Performed by: INTERNAL MEDICINE

## 2021-11-05 PROCEDURE — 99214 OFFICE O/P EST MOD 30 MIN: CPT | Performed by: INTERNAL MEDICINE

## 2021-11-05 PROCEDURE — 90471 IMMUNIZATION ADMIN: CPT | Performed by: INTERNAL MEDICINE

## 2021-11-05 RX ORDER — ATORVASTATIN CALCIUM 40 MG/1
TABLET, FILM COATED ORAL
Qty: 90 TABLET | Refills: 1 | Status: SHIPPED | OUTPATIENT
Start: 2021-11-05 | End: 2022-05-05

## 2021-11-05 RX ORDER — CLOPIDOGREL BISULFATE 75 MG/1
75 TABLET ORAL DAILY
Qty: 90 TABLET | Refills: 1 | Status: CANCELLED | OUTPATIENT
Start: 2021-11-05

## 2021-11-05 ASSESSMENT — ENCOUNTER SYMPTOMS
TROUBLE SWALLOWING: 0
ABDOMINAL PAIN: 0
WHEEZING: 0
VOMITING: 0
SHORTNESS OF BREATH: 0
VOICE CHANGE: 0
NAUSEA: 0

## 2021-11-05 NOTE — PROGRESS NOTES
Tian Slot  1970  male  46 y.o. SUBJECTIVE:       Chief Complaint   Patient presents with    6 Month Follow-Up       HPI:  Follow-up visit for chronic problems. Patient did not schedule colonoscopy yet. History of coronary disease hypertension hyperlipidemia, denies any chest pain palpitation dizziness sweating. Patient reported history of at least three stent placement. Past Medical History:   Diagnosis Date    Coronary artery disease involving native coronary artery of native heart with angina pectoris ([address]     ~Successful PCI to LAD with 4.0x8, 3.5x38 and 3.0x15 GUALBERTO. PD with 3.75x12 NC to 20atm. Excellent Result    Hyperlipidemia     Hypertension      Past Surgical History:   Procedure Laterality Date   Kimberlee [address] Dr[address] Jailyn Willis    TONSILLECTOMY AND ADENOIDECTOMY  1974     Social History     Socioeconomic History    Marital status:      Spouse name: None    Number of children: 3    Years of education: None    Highest education level: None   Occupational History    Occupation:      Comment: 42 [employer] Occupation: Dept Mgr   Tobacco Use    Smoking status: Never Smoker    Smokeless tobacco: Never Used   Vaping Use    Vaping Use: Never used   Substance and Sexual Activity    Alcohol use: Yes     Comment: socially, 1 drink of beer monthly    Drug use: No    Sexual activity: Yes     Partners: Female   Other Topics Concern    None   Social History Narrative    None     Social Determinants of Health     Financial Resource Strain:     Difficulty of Paying Living Expenses:    Food Insecurity:     Worried About Running Out of Food in the Last Year:     Ran Out of Food in the Last Year:    Transportation Needs:     Lack of Transportation (Medical):      Lack of Transportation (Non-Medical):    Physical Activity:     Days of Exercise per Week:     Minutes of Exercise per Session: Stress:     Feeling of Stress :    Social Connections:     Frequency of Communication with Friends and Family:     Frequency of Social Gatherings with Friends and Family:     Attends [employer] Services:     Active Member of Clubs or Organizations:     Attends Club or Organization Meetings:     Marital Status:    Intimate Partner Violence:     Fear of Current or Ex-Partner:     Emotionally Abused:     Physically Abused:     Sexually Abused:      Family History   Problem Relation Age of Onset    Migraines Mother     High Blood Pressure Father     Heart Surgery Father         cabg x3    Heart Surgery Sister         stents    Heart Surgery Brother         CABG x4    Heart Surgery Brother         CABG x5    Heart Failure Maternal Grandmother     Diabetes Other         aunt, niece       Review of Systems   Constitutional: Negative for appetite change, diaphoresis and fatigue. HENT: Negative for trouble swallowing and voice change. Respiratory: Negative for shortness of breath and wheezing. Cardiovascular: Negative for chest pain and palpitations. Gastrointestinal: Negative for abdominal pain, nausea and vomiting. Neurological: Negative for dizziness and light-headedness. OBJECTIVE:  Pulse Readings from Last 4 Encounters:   11/05/21 68   06/17/21 60   06/08/21 55   04/08/21 60     Wt Readings from Last 4 Encounters:   11/05/21 261 lb 3.2 oz (118.5 kg)   06/17/21 253 lb (114.8 kg)   06/08/21 250 lb (113.4 kg)   04/16/21 247 lb (112 kg)     BP Readings from Last 4 Encounters:   11/05/21 122/82   06/17/21 114/82   06/08/21 120/80   04/08/21 122/84     Physical Exam  Vitals and nursing note reviewed. Constitutional:       Appearance: He is not ill-appearing. Eyes:      Conjunctiva/sclera: Conjunctivae normal.   Cardiovascular:      Rate and Rhythm: Normal rate and regular rhythm. Pulses: Normal pulses. Heart sounds: Normal heart sounds.    Pulmonary:      Effort: Pulmonary effort is normal.      Breath sounds: Normal breath sounds. Abdominal:      General: Bowel sounds are normal.   Neurological:      General: No focal deficit present. Mental Status: He is alert and oriented to person, place, and time. CBC:   Lab Results   Component Value Date    WBC 5.3 06/02/2021    HGB 16.8 06/02/2021    HCT 50.4 06/02/2021     06/02/2021     CMP:  Lab Results   Component Value Date     06/02/2021    K 4.8 06/02/2021     06/02/2021    CO2 25 06/02/2021    ANIONGAP 14 06/02/2021    GLUCOSE 106 06/02/2021    BUN 13 06/02/2021    CREATININE 1.3 06/02/2021    GFRAA >60 06/02/2021    GFRAA >60 03/12/2010    CALCIUM 9.2 06/02/2021    PROT 6.8 06/02/2021    LABALBU 4.6 06/02/2021    AGRATIO 2.1 06/02/2021    BILITOT 0.7 06/02/2021    ALKPHOS 54 06/02/2021    ALT 25 06/02/2021    AST 17 06/02/2021    GLOB 2.2 06/02/2021     URINALYSIS:  Lab Results   Component Value Date    GLUCOSEU Negative 09/19/2017    GLUCOSEU NEGATIVE 03/12/2010    KETUA Negative 09/19/2017    SPECGRAV 1.011 09/19/2017    BLOODU Negative 09/19/2017    PHUR 6.0 09/19/2017    PROTEINU Negative 09/19/2017    NITRU Negative 09/19/2017    LEUKOCYTESUR Negative 09/19/2017    LABMICR Not Indicated 09/19/2017    URINETYPE Not Specified 09/19/2017     HBA1C:   Lab Results   Component Value Date    LABA1C 5.6 12/11/2020    .9 09/18/2018     MICRO/ALB:   Lab Results   Component Value Date    LABMICR Not Indicated 09/19/2017     LIPID:  Lab Results   Component Value Date    CHOL 187 06/02/2021    TRIG 155 06/02/2021    HDL 35 06/02/2021    HDL 39 03/12/2010    LDLCALC 121 06/02/2021    LABVLDL 31 06/02/2021     TSH:   Lab Results   Component Value Date    TSHREFLEX 1.85 01/25/2017     PSA:   Lab Results   Component Value Date    PSA 0.67 06/02/2021        ASSESSMENT/PLAN:  Assessment/Plan:  David Diaz was seen today for 6 month follow-up.     Diagnoses and all orders for this visit:    Essential hypertension  The current medical regimen is effective;  continue present plan and medications. Hyperlipidemia, unspecified hyperlipidemia type  -     atorvastatin (LIPITOR) 40 MG tablet; TAKE 1 TABLET BY MOUTH EVERY DAY  The patient is asked to make an attempt to improve diet and exercise patterns to aid in medical management of this problem. Coronary artery disease of native artery of native heart with stable angina pectoris ([address]  Status post multiple stent placement. Continue atorvastatin low-dose aspirin metoprolol  Plavix-duration recommendation per cardiology      Colon cancer screening  -     Sera Schulz MD, Gastroenterology, [facility]    Need for pneumococcal vaccination    Need for shingles vaccine  Patient is advised to get second shingles vaccines in 2 to 6 months. Other orders  -     metoprolol tartrate (LOPRESSOR) 25 MG tablet;  Take 1 tablet by mouth 2 times daily  -     Pneumococcal polysaccharide vaccine 23-valent greater than or equal to 1yo subcutaneous/IM  -     Zoster Subunit [facility])            Orders Placed This Encounter   Procedures    Pneumococcal polysaccharide vaccine 23-valent greater than or equal to 1yo subcutaneous/IM    Zoster Subunit [facility])    AFL - Malena Valentine MD, Gastroenterology, [facility]     Referral Priority:   Routine     Referral Type:   Eval and Treat     Referral Reason:   Specialty Services Required     Referred to Provider:   Smith Penny MD     Requested Specialty:   Gastroenterology     Number of Visits Requested:   1     Current Outpatient Medications   Medication Sig Dispense Refill    metoprolol tartrate (LOPRESSOR) 25 MG tablet Take 1 tablet by mouth 2 times daily 180 tablet 1    atorvastatin (LIPITOR) 40 MG tablet TAKE 1 TABLET BY MOUTH EVERY DAY 90 tablet 1    amoxicillin (AMOXIL) 500 MG capsule       clopidogrel (PLAVIX) 75 MG tablet TAKE 1 TABLET BY MOUTH EVERY DAY 90 tablet 1    losartan-hydroCHLOROthiazide (HYZAAR) 50-12.5 MG per tablet Take 1 tablet by mouth daily 90 tablet 3    acetaminophen (TYLENOL) 500 MG tablet Take 500 mg by mouth every 6 hours as needed for Pain      aspirin EC 81 MG EC tablet Take 1 tablet by mouth daily 90 tablet 1     No current facility-administered medications for this visit. Return in about 6 months (around 5/5/2022). An After Visit Summary was printed and given to the patient. Documentation was done using voice recognition dragon software. Every effort was made to ensure accuracy; however, inadvertent  Unintentional computerized transcription errors may be present.

## 2021-12-03 RX ORDER — CLOPIDOGREL BISULFATE 75 MG/1
TABLET ORAL
Qty: 90 TABLET | Refills: 1 | Status: SHIPPED | OUTPATIENT
Start: 2021-12-03 | End: 2022-06-06

## 2021-12-03 NOTE — TELEPHONE ENCOUNTER
Received refill request for Clopidogrel from [facility] pharmacy.     Last ov: 06/17/2021 LES    Last labs: 06/02/2021 CBC    Last Refill: 05/27/2021 #90 w/ 1 refill    Next appointment: no future OV

## 2022-02-28 ENCOUNTER — OFFICE VISIT (OUTPATIENT)
Dept: ORTHOPEDIC SURGERY | Age: 52
End: 2022-02-28
Payer: COMMERCIAL

## 2022-02-28 VITALS — BODY MASS INDEX: 35.54 KG/M2 | WEIGHT: 262.4 LBS | HEIGHT: 72 IN

## 2022-02-28 DIAGNOSIS — M17.11 PRIMARY OSTEOARTHRITIS OF RIGHT KNEE: Primary | ICD-10-CM

## 2022-02-28 DIAGNOSIS — S83.241D TEAR OF MEDIAL MENISCUS OF RIGHT KNEE, CURRENT, UNSPECIFIED TEAR TYPE, SUBSEQUENT ENCOUNTER: ICD-10-CM

## 2022-02-28 PROCEDURE — 20610 DRAIN/INJ JOINT/BURSA W/O US: CPT | Performed by: PHYSICIAN ASSISTANT

## 2022-02-28 PROCEDURE — 99213 OFFICE O/P EST LOW 20 MIN: CPT | Performed by: PHYSICIAN ASSISTANT

## 2022-02-28 RX ORDER — LIDOCAINE HYDROCHLORIDE 10 MG/ML
5 INJECTION, SOLUTION INFILTRATION; PERINEURAL ONCE
Status: COMPLETED | OUTPATIENT
Start: 2022-02-28 | End: 2022-02-28

## 2022-02-28 RX ORDER — BETAMETHASONE SODIUM PHOSPHATE AND BETAMETHASONE ACETATE 3; 3 MG/ML; MG/ML
12 INJECTION, SUSPENSION INTRA-ARTICULAR; INTRALESIONAL; INTRAMUSCULAR; SOFT TISSUE ONCE
Status: COMPLETED | OUTPATIENT
Start: 2022-02-28 | End: 2022-02-28

## 2022-02-28 RX ADMIN — LIDOCAINE HYDROCHLORIDE 5 ML: 10 INJECTION, SOLUTION INFILTRATION; PERINEURAL at 16:45

## 2022-02-28 RX ADMIN — BETAMETHASONE SODIUM PHOSPHATE AND BETAMETHASONE ACETATE 12 MG: 3; 3 INJECTION, SUSPENSION INTRA-ARTICULAR; INTRALESIONAL; INTRAMUSCULAR; SOFT TISSUE at 16:45

## 2022-02-28 NOTE — PROGRESS NOTES
Patient Name: Danilo Hernandez  Medical Record Number: [id number]  YOB: 1970  Date of Encounter: 2/28/2022     Chief Complaint   Patient presents with    Follow-up     Right knee, no pain, feels weak, sometimes gives out. History of Present Illness:   Mr. Danilo Hernandez is here in follow up regarding his right knee. Patient has had chronic intermittent right knee pain for many years. He was last seen on 4/16/2021. 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 He was given a cortisone injection at his last visit as well as a handout of home exercises and stretches. Patient states he began working out a few months ago in attempts to lose weight and has since began having increasing right knee aching and weakness. He states he is more concerned with the weakness and instability of the right knee than he has the aching. He denies swelling, redness or warmth of the right knee. He denies locking or catching    The patient's past medical history, medications, allergies, family history, social history, and review of systems have been reviewed, and dated and are recorded in the chart under the 'MEDIA\" tab. Physical Exam:    Mr. Danilo Hernandez appears well, he is in no apparent distress, he demonstrates appropriate mood & affect. He is alert and oriented to person, place and time. Ht 6' (1.829 m)   Wt 262 lb 6.4 oz (119 kg)   BMI 35.59 kg/m²     On examination of patient's right knee there is no swelling or joint effusion. Patient denies tenderness on palpation of the right knee. Range of motion is 0 to 130 degrees. Strength is 5+/5. Special testing is negative.       Orders  Orders Placed This Encounter   Procedures   [address] [facility]     Referral Priority:   Routine     Referral Type:   Eval and Treat     Referral Reason:   Specialty Services Required     Requested Specialty:   Physical Therapy     Number of Visits Requested:   1    [place] ARTHROCENTESIS ASPIR&/INJ MAJOR JT/BURSA W/O US       Impression:   Diagnosis Orders   1. Primary osteoarthritis of right knee  [facility]    betamethasone acetate-betamethasone sodium phosphate (CELESTONE) injection 12 mg    lidocaine 1 % injection 5 mL    [place] ARTHROCENTESIS ASPIR&/INJ MAJOR JT/BURSA W/O US   2. Tear of medial meniscus of right knee, current, unspecified tear type, subsequent encounter  [facility]    betamethasone acetate-betamethasone sodium phosphate (CELESTONE) injection 12 mg    lidocaine 1 % injection 5 mL    [place] ARTHROCENTESIS ASPIR&/INJ MAJOR JT/BURSA W/O US       Injection:  After discussing the risks and benefits of cortisone injection including increased pain, drug reaction, infection, bleeding, blood glucose elevation, lack of improvement and neurovascular injury he agreed to receive one today. Questions were encouraged and answered. The correct patient, procedure, site and side were identified. The right knee was prepped with Betadine and 2 mL of betamethasone (12mg) mixed with 5 mL 1% lidocaine plain (50mg) were instilled with careful aspiration and injection under aseptic technique. The skin was then cleaned again with alcohol and a sterile adhesive dressing was applied. He tolerated this well and was instructed regarding post-injection care of icing and decreased activity as necessary. Treatment Plan:    Patient has begun having slightly more right knee pain, weakness and instability since starting to work out over the last few months. He responded very well to his cortisone injection given in April of last year. We discussed continuing with conservative treatments. He was given a repeat right knee cortisone injection. I feel he will benefit from seeing physical therapy a few times to direct him on more appropriate home exercises and stretches.   He is advised to follow back in 4 weeks or before that time with any concerns. If his pain does not improve we could consider advanced imaging    Karen Charles was informed of the results of any imaging. We discussed treatment options and a time was given to answer questions. A plan was proposed and Karen Charles understand and accepts this course of care. Electronically signed by Xavier Ruelas PA-C on 6/34/4120  Board Certified [facility]    Please note that portions of this note were completed with a voice recognition program.  Efforts were made to edit the dictations but occasionally words are mis-transcribed.

## 2022-05-05 ENCOUNTER — OFFICE VISIT (OUTPATIENT)
Dept: INTERNAL MEDICINE CLINIC | Age: 52
End: 2022-05-05
Payer: COMMERCIAL

## 2022-05-05 VITALS
SYSTOLIC BLOOD PRESSURE: 136 MMHG | HEART RATE: 62 BPM | OXYGEN SATURATION: 96 % | HEIGHT: 72 IN | WEIGHT: 258.6 LBS | DIASTOLIC BLOOD PRESSURE: 88 MMHG | BODY MASS INDEX: 35.03 KG/M2

## 2022-05-05 DIAGNOSIS — Z12.11 SCREEN FOR COLON CANCER: ICD-10-CM

## 2022-05-05 DIAGNOSIS — Z23 NEED FOR SHINGLES VACCINE: ICD-10-CM

## 2022-05-05 DIAGNOSIS — Z12.5 SCREENING PSA (PROSTATE SPECIFIC ANTIGEN): ICD-10-CM

## 2022-05-05 DIAGNOSIS — I10 ESSENTIAL HYPERTENSION: ICD-10-CM

## 2022-05-05 DIAGNOSIS — R73.03 PREDIABETES: ICD-10-CM

## 2022-05-05 DIAGNOSIS — I25.118 CORONARY ARTERY DISEASE OF NATIVE ARTERY OF NATIVE HEART WITH STABLE ANGINA PECTORIS (HCC): Primary | ICD-10-CM

## 2022-05-05 DIAGNOSIS — E78.5 HYPERLIPIDEMIA, UNSPECIFIED HYPERLIPIDEMIA TYPE: ICD-10-CM

## 2022-05-05 PROCEDURE — 99214 OFFICE O/P EST MOD 30 MIN: CPT | Performed by: INTERNAL MEDICINE

## 2022-05-05 PROCEDURE — 90750 HZV VACC RECOMBINANT IM: CPT | Performed by: INTERNAL MEDICINE

## 2022-05-05 PROCEDURE — 90471 IMMUNIZATION ADMIN: CPT | Performed by: INTERNAL MEDICINE

## 2022-05-05 RX ORDER — ATORVASTATIN CALCIUM 80 MG/1
TABLET, FILM COATED ORAL
Qty: 90 TABLET | Refills: 3 | Status: SHIPPED | OUTPATIENT
Start: 2022-05-05

## 2022-05-05 ASSESSMENT — PATIENT HEALTH QUESTIONNAIRE - PHQ9
SUM OF ALL RESPONSES TO PHQ QUESTIONS 1-9: 0
2. FEELING DOWN, DEPRESSED OR HOPELESS: 0
SUM OF ALL RESPONSES TO PHQ QUESTIONS 1-9: 0
SUM OF ALL RESPONSES TO PHQ9 QUESTIONS 1 & 2: 0
1. LITTLE INTEREST OR PLEASURE IN DOING THINGS: 0

## 2022-05-05 ASSESSMENT — ENCOUNTER SYMPTOMS
WHEEZING: 0
NAUSEA: 0
VOMITING: 0
ABDOMINAL PAIN: 0
SHORTNESS OF BREATH: 0

## 2022-05-05 NOTE — PROGRESS NOTES
Ayana Maira  1970  male  46 y.o. SUBJECTIVE:       Chief Complaint   Patient presents with    6 Month Follow-Up     2nd Shingrex vaccine    Hypertension    Hyperlipidemia       HPI:  Follow-up visit for chronic problems. Coronary Artery Disease/HTN:    Today patient denies any chest pain, dizziness, palpitation, shortness of breath, exertional dyspnea, nausea, vomiting, and sweating. Taking heart medications as prescribed and noted in the chart. Denies any orthostatic symptoms or any medication's side effects. Patient nott scheduled colonoscopy yet. Past Medical History:   Diagnosis Date    Coronary artery disease involving native coronary artery of native heart with angina pectoris ([facility] [address]     ~Successful PCI to LAD with 4.0x8, 3.5x38 and 3.0x15 GUALBERTO. PD with 3.75x12 NC to 20atm.  Excellent Result    Hyperlipidemia     Hypertension      Past Surgical History:   Procedure Laterality Date   Anais King Rm    TONSILLECTOMY AND ADENOIDECTOMY  1974     Social History     Socioeconomic History    Marital status:      Spouse name: None    Number of children: 3    Years of education: None    Highest education level: None   Occupational History    Occupation:      Comment: 42 [address] [employer] Occupation: Dept Mgr   Tobacco Use    Smoking status: Never Smoker    Smokeless tobacco: Never Used   Vaping Use    Vaping Use: Never used   Substance and Sexual Activity    Alcohol use: Not Currently     Comment: socially, 1 drink of beer monthly    Drug use: No    Sexual activity: Yes     Partners: Female   Other Topics Concern    None   Social History Narrative    None     Social Determinants of Health     Financial Resource Strain:     Difficulty of Paying Living Expenses: Not on file   Food Insecurity:     Worried About Running Out of Food in the Last Year: Not on file    Simeon of Food in the Last Year: Not on file   Transportation Needs:     Lack of Transportation (Medical): Not on file    Lack of Transportation (Non-Medical): Not on file   Physical Activity:     Days of Exercise per Week: Not on file    Minutes of Exercise per Session: Not on file   Stress:     Feeling of Stress : Not on file   Social Connections:     Frequency of Communication with Friends and Family: Not on file    Frequency of Social Gatherings with Friends and Family: Not on file    Attends Jainism Services: Not on file    Active Member of [address] [place] [address] [employer] or Organizations: Not on file    Attends Club or Organization Meetings: Not on file    Marital Status: Not on file   Intimate Partner Violence:     Fear of Current or Ex-Partner: Not on file    Emotionally Abused: Not on file    Physically Abused: Not on file    Sexually Abused: Not on file   Housing Stability:     Unable to Pay for Housing in the Last Year: Not on file    Number of [place] in the Last Year: Not on file    Unstable Housing in the Last Year: Not on file     Family History   Problem Relation Age of Onset    Migraines Mother     High Blood Pressure Father     Heart Surgery Father         cabg x3    Heart Surgery Sister         stents    Heart Surgery Brother         CABG x4    Heart Surgery Brother         CABG x5    Heart Failure Maternal Grandmother     Diabetes Other         aunt, niece       Review of Systems   Constitutional: Negative for diaphoresis and unexpected weight change. Respiratory: Negative for shortness of breath and wheezing. Cardiovascular: Negative for chest pain and palpitations. Gastrointestinal: Negative for abdominal pain, nausea and vomiting. Musculoskeletal:        Chronic right knee pain and seeing orthopedic. Neurological: Negative for dizziness, light-headedness and headaches. Psychiatric/Behavioral: Negative for sleep disturbance.        OBJECTIVE:  Pulse Readings from Last 4 Encounters:   05/05/22 62   11/05/21 68   06/17/21 60   06/08/21 55     Wt Readings from Last 4 Encounters:   05/05/22 258 lb 9.6 oz (117.3 kg)   02/28/22 262 lb 6.4 oz (119 kg)   11/05/21 261 lb 3.2 oz (118.5 kg)   06/17/21 253 lb (114.8 kg)     BP Readings from Last 4 Encounters:   05/05/22 136/88   11/05/21 122/82   06/17/21 114/82   06/08/21 120/80     Physical Exam  Vitals and nursing note reviewed. Constitutional:       Appearance: Normal appearance. Eyes:      Conjunctiva/sclera: Conjunctivae normal.   Cardiovascular:      Rate and Rhythm: Normal rate and regular rhythm. Pulses: Normal pulses. Heart sounds: Normal heart sounds. Pulmonary:      Effort: Pulmonary effort is normal.      Breath sounds: Normal breath sounds. Abdominal:      General: Bowel sounds are normal.   Musculoskeletal:      Right lower leg: No edema. Left lower leg: No edema. Neurological:      General: No focal deficit present. Mental Status: He is alert and oriented to person, place, and time.          CBC:   Lab Results   Component Value Date    WBC 5.3 06/02/2021    HGB 16.8 06/02/2021    HCT 50.4 06/02/2021     06/02/2021     CMP:  Lab Results   Component Value Date     06/02/2021    K 4.8 06/02/2021     06/02/2021    CO2 25 06/02/2021    ANIONGAP 14 06/02/2021    GLUCOSE 106 06/02/2021    BUN 13 06/02/2021    CREATININE 1.3 06/02/2021    GFRAA >60 06/02/2021    GFRAA >60 03/12/2010    CALCIUM 9.2 06/02/2021    PROT 6.8 06/02/2021    LABALBU 4.6 06/02/2021    AGRATIO 2.1 06/02/2021    BILITOT 0.7 06/02/2021    ALKPHOS 54 06/02/2021    ALT 25 06/02/2021    AST 17 06/02/2021    GLOB 2.2 06/02/2021     URINALYSIS:  Lab Results   Component Value Date    GLUCOSEU Negative 09/19/2017    GLUCOSEU NEGATIVE 03/12/2010    KETUA Negative 09/19/2017    SPECGRAV 1.011 09/19/2017    BLOODU Negative 09/19/2017    PHUR 6.0 09/19/2017    PROTEINU Negative 09/19/2017    NITRU Negative 09/19/2017    LEUKOCYTESUR Negative 09/19/2017    LABMICR Not Indicated 09/19/2017    URINETYPE Not Specified 09/19/2017     HBA1C:   Lab Results   Component Value Date    LABA1C 5.6 12/11/2020    .9 09/18/2018     MICRO/ALB:   Lab Results   Component Value Date    LABMICR Not Indicated 09/19/2017     LIPID:  Lab Results   Component Value Date    CHOL 187 06/02/2021    TRIG 155 06/02/2021    HDL 35 06/02/2021    HDL 39 03/12/2010    LDLCALC 121 06/02/2021    LABVLDL 31 06/02/2021     TSH:   Lab Results   Component Value Date    TSHREFLEX 1.85 01/25/2017     PSA:   Lab Results   Component Value Date    PSA 0.67 06/02/2021        ASSESSMENT/PLAN:  Assessment/Plan:  Patient came with multiple labs done outside. His LDL is 80. He continues to be prediabetes. PSA within normal limit. His hemoglobin is also within normal limits  Ade Gtz was seen today for 6 month follow-up, hypertension and hyperlipidemia. Diagnoses and all orders for this visit:    Coronary artery disease of native artery of native heart with stable angina pectoris [facility])  Patient gets yearly cardiac stress test.  Patient denies any exertional chest pain, dyspnea, palpitations, syncope, orthopnea, edema or paroxysmal nocturnal dyspnea. He will continue current Plavix, metoprolol, aspirin. Will increase atorvastatin to 80 mg/day  -     CBC; Future  -     Comprehensive Metabolic Panel; Future  -     Lipid Panel; Future    Need for shingles vaccine  -     Zoster recombinant [facility])    Hyperlipidemia, unspecified hyperlipidemia type  -     Lipid Panel; Future  -     atorvastatin (LIPITOR) 80 MG tablet; TAKE 1 TABLET BY MOUTH EVERY DAY    Essential hypertension  Excellent control with current Hyzaar and metoprolol  -     CBC; Future  -     Comprehensive Metabolic Panel; Future  -     Urinalysis; Future    Screening PSA (prostate specific antigen)  -     PSA Screening;  Future    Prediabetes  Continue diet lifestyle changes  -     Hemoglobin A1C; Future    Screen for colon cancer  -     SALMA Butcher MD, Gastroenterology, [facility]            Orders Placed This Encounter   Procedures    Zoster recombinant [facility])    CBC     Standing Status:   Future     Standing Expiration Date:   5/5/2023    Comprehensive Metabolic Panel     Standing Status:   Future     Standing Expiration Date:   5/5/2023    PSA Screening     Standing Status:   Future     Standing Expiration Date:   5/5/2023    Lipid Panel     Standing Status:   Future     Standing Expiration Date:   5/5/2023     Order Specific Question:   Is Patient Fasting?/# of Hours     Answer:   10    Hemoglobin A1C     Standing Status:   Future     Standing Expiration Date:   5/5/2023    Urinalysis     Standing Status:   Future     Standing Expiration Date:   5/5/2023    SALMA Butcher MD, Gastroenterology, [facility]     Referral Priority:   Routine     Referral Type:   Eval and Treat     Referral Reason:   Specialty Services Required     Referred to Provider:   Judie Al MD     Requested Specialty:   Gastroenterology     Number of Visits Requested:   1     Current Outpatient Medications   Medication Sig Dispense Refill    atorvastatin (LIPITOR) 80 MG tablet TAKE 1 TABLET BY MOUTH EVERY DAY 90 tablet 3    clopidogrel (PLAVIX) 75 MG tablet TAKE 1 TABLET BY MOUTH EVERY DAY 90 tablet 1    metoprolol tartrate (LOPRESSOR) 25 MG tablet Take 1 tablet by mouth 2 times daily 180 tablet 1    losartan-hydroCHLOROthiazide (HYZAAR) 50-12.5 MG per tablet Take 1 tablet by mouth daily 90 tablet 3    acetaminophen (TYLENOL) 500 MG tablet Take 500 mg by mouth every 6 hours as needed for Pain      aspirin EC 81 MG EC tablet Take 1 tablet by mouth daily 90 tablet 1    amoxicillin (AMOXIL) 500 MG capsule  (Patient not taking: Reported on 5/5/2022)       No current facility-administered medications for this visit. Return in about 6 months (around 11/5/2022).   An After Visit Summary was printed and given to the patient. Documentation was done using voice recognition dragon software. Every effort was made to ensure accuracy; however, inadvertent  Unintentional computerized transcription errors may be present.

## 2022-05-06 ENCOUNTER — HOSPITAL ENCOUNTER (OUTPATIENT)
Dept: PHYSICAL THERAPY | Age: 52
Setting detail: THERAPIES SERIES
Discharge: HOME | End: 2022-05-06
Payer: COMMERCIAL

## 2022-05-06 PROCEDURE — 97530 THERAPEUTIC ACTIVITIES: CPT

## 2022-05-06 PROCEDURE — 97110 THERAPEUTIC EXERCISES: CPT

## 2022-05-06 PROCEDURE — 97161 PT EVAL LOW COMPLEX 20 MIN: CPT

## 2022-05-06 NOTE — PLAN OF CARE
[id number] [address], [address] [place] [address] Drive  Phone: [phone]   Fax: [phone]                                                       Physical Therapy Certification    Dear   Marleen Beckford PA-C,    We had the pleasure of evaluating the following patient for physical therapy services at [address] [place] [address]. A summary of our findings can be found in the initial assessment below. This includes our plan of care. If you have any questions or concerns regarding these findings, please do not hesitate to contact me at the office phone number checked above. Thank you for the referral.       Physician Signature:_______________________________Date:__________________  By signing above (or electronic signature), therapists plan is approved by physician      Patient: Ayush Zamora   : 1970   MRN: [id number]  Referring Physician:   Marleen Beckford PA-C     Evaluation Date: 2022      Medical Diagnosis Information:  Diagnosis: M17.11 (ICD-10-CM) - Primary osteoarthritis of right kneeS83.241D (ICD-10-CM) - Tear of medial meniscus of right knee, current, unspecified tear type, subsequent encounter   Treatment Diagnosis: R knee pain, hip weakness                                         Insurance information: PT Insurance Information: cigna, ded not met, no copay, no auth     Precautions/ Contra-indications: none  Latex Allergy:  [x]NO      []YES  Preferred Language for Healthcare:   [x]English       []Other:    C-SSRS Triggered by Intake questionnaire (Past 2 wk assessment ):   [x] No, Questionnaire did not trigger screening.   [] Yes, Patient intake triggered C-SSRS Screening     [] Completed, no further action required.    [] Completed, PCP notified via Epic    SUBJECTIVE: Patient stated complaint:R knee pain since ; pt noticed \"weakness\" walking in grass and on steps, occasionally has to take steps non reciprocal.Pt is fearful to carry things on steps due to \"weakness. \" Pt had cortisone shot which relieved pain for about 1 yr and a second shot about 1 month ago which is making his knee feel ''stronger\". Pt has clicking in knee and can no longer run and jump. Pt reports knee felt like it would giving out but never did. Pt denies sleep disturbances.      Relevant Medical History:HTN, hx of R mensicus tear, 3 cardiac stents placed in 2020  Functional Outcome: FOTO physical FS primary measure score = 61; Risk adjusted = 59    Pain Scale: 0-4/10  Easing factors: rest, ice  Provocative factors: steps, walking on uneven ground     Type: []Constant   [x]Intermittent  []Radiating []Localized []other:     Numbness/Tingling: none    Occupation/School:     Living Status/Prior Level of Function:Prior to this injury / incident, pt was independent with ADLs and IADLs, hiking, bball      OBJECTIVE:   Palpation: no TTP    Functional Mobility/Transfers: anterior translation of knees with squat    Gait: (include devices/WB status) WNL      Lumbar AROM screen: [x] [facility]  [] abnormal:     PROM AROM    L R L R   Hip Flexion       Hip Abduction       Hip ER       Hip IR       Knee Flexion   135 140   Knee Extension       Dorsiflexion        Plantarflexion        Inversion        Eversion            Strength (0-5) / Myotomes Left Right   Hip Flexion - supine 5 5   Hip Flexion - seated (L1-2)     Hip Abduction 4 4+   Hip ext 5 5   Hip ER     Hip IR     Quads (L2-4) 5 5   Hamstrings 5 4   Ankle Dorsiflexion (L4-5)     Ankle Plantarflexion (S1-2)     Ankle Inversion     Ankle Eversion (S1-2)     Great Toe Extension (L5)          Flexibility     Hamstrings (90/90)     ITB (Cortney)     Quads (Ely's)     Hip Flexor (Almas)          Girth (cm)     infrapatella 39.5 39   Suprapatellar 42.5 43   Figure 8     Transmalleolar     Metatarsal Heads         Joint mobility:    []Normal    [x]Hypo patellofemoral in all directions   []Hyper    Orthopaedic Special Tests  Positive  Negative  NT Comments    Hip       GO / Hank's       FADIR       Scour       Trendelenburg              Knee       Lachman's / Anterior Drawer       Posterior Drawer       Varus Stress       Valgus Stress       Cody's        Appley's       Thessaly's       Patellar Tracking x             Ankle       Anterior Drawer       Talar Tilt       Ortega       Debra's                                        [x] Patient history, allergies, meds reviewed. Medical chart reviewed. See intake form. Review Of Systems (ROS):  [x]Performed Review of systems (Integumentary, CardioPulmonary, Neurological) by intake and observation. Intake form has been scanned into medical record. Patient has been instructed to contact their primary care physician regarding ROS issues if not already being addressed at this time.       Co-morbidities/Complexities (which will affect course of rehabilitation):   []None        []Hx of COVID   Arthritic conditions   []Rheumatoid arthritis (M05.9)  []Osteoarthritis (M19.91)  []Gout   Cardiovascular conditions   [x]Hypertension (I10)  []Hyperlipidemia (E78.5)  []Angina pectoris (I20)  []Atherosclerosis (I70)  []Pacemaker  [x]Hx of CABG/stent/  cardiac surgeries   Musculoskeletal conditions   []Disc pathology   []Congenital spine pathologies   []Osteoporosis (M81.8)  []Osteopenia (M85.8)  []Scoliosis       Endocrine conditions   []Hypothyroid (E03.9)  []Hyperthyroid Gastrointestinal conditions   []Constipation (X54.58)   Metabolic conditions   []Morbid obesity (E66.01)  []Diabetes type 1(E10.65) or 2 (E11.65)   []Neuropathy (G60.9)     Cardio/Pulmonary conditions   []Asthma (J45)  []Coughing   []COPD (J44.9)  []CHF  []A-fib   Psychological Disorders  []Anxiety (F41.9)  []Depression (F32.9)   []Other:   Developmental Disorders  []Autism (F84.0)  []CP (G80)  []Down Syndrome (Q90.9)  []Developmental delay     Neurological conditions  []Prior Stroke (I69.30)  []Parkinson's (G20)  []Encephalopathy (G93.40)  []MS (Otelia Craft)  []Post-polio (G14)  []SCI  []TBI  []ALS Other conditions  []Fibromyalgia (M79.7)  []Vertigo  []Syncope  []Kidney Failure  []Cancer      []currently undergoing                treatment  []Pregnancy  []Incontinence   Prior surgeries  []involved limb  []previous spinal surgery  [] section birth  []hysterectomy  []bowel / bladder surgery  []other relevant surgeries   []Other:              Barriers to/and or personal factors that will affect rehab potential:              []Age  []Sex    []Smoker              []Motivation/Lack of Motivation                        []Co-Morbidities              []Cognitive Function, education/learning barriers              []Environmental, home barriers              []profession/work barriers  []past PT/medical experience  [x]other: none  Justification:     Falls Risk Assessment (30 days):   [x] Falls Risk assessed and no intervention required. [] Falls Risk assessed and Patient requires intervention due to being higher risk   TUG score (>12s at risk):     [] Falls education provided, including        ASSESSMENT: 45 y/o male with hx of R knee OA and medial meniscus tear presents with chronic R knee pain, patellar hypomobility and impaired patellar tracking, hip weakness, and impaired motor control. Pt limited with stairs, hills, and walking on uneven ground.   Functional Impairments:     [x]Noted lumbar/proximal hip/LE joint hypomobility   []Decreased LE functional ROM   [x]Decreased core/proximal hip strength and neuromuscular control   [x]Decreased LE functional strength   []Reduced balance/proprioceptive control   []other:      Functional Activity Limitations (from functional questionnaire and intake)   []Reduced ability to tolerate prolonged functional positions   [x]Reduced ability or difficulty with changes of positions or transfers between positions   []Reduced ability to maintain good posture and demonstrate good body mechanics with sitting, bending, and lifting   []Reduced ability to sleep   [] Reduced ability or tolerance with driving and/or computer work   [x]Reduced ability to perform lifting, carrying tasks   [x]Reduced ability to squat   []Reduced ability to forward bend   [x]Reduced ability to ambulate prolonged functional periods/distances/surfaces   [x]Reduced ability to ascend/descend stairs   [x]Reduced ability to run, hop, cut or jump   []other:    Participation Restrictions   []Reduced participation in self care activities   [x]Reduced participation in home management activities   []Reduced participation in work activities   [x]Reduced participation in social activities. [x]Reduced participation in sport/recreation activities. Classification :    []Signs/symptoms consistent with post-surgical status including decreased ROM, strength and function.    []Signs/symptoms consistent with joint sprain/strain   []Signs/symptoms consistent with patella-femoral syndrome   [x]Signs/symptoms consistent with knee OA/hip OA   []Signs/symptoms consistent with internal derangement of knee/Hip   []Signs/symptoms consistent with functional hip weakness/NMR control      []Signs/symptoms consistent with tendinitis/tendinosis    []signs/symptoms consistent with pathology which may benefit from Dry needling      []other:      Prognosis/Rehab Potential:      []Excellent   [x]Good    []Fair   []Poor    Tolerance of evaluation/treatment:    []Excellent   [x]Good    []Fair   []Poor    Physical Therapy Evaluation Complexity Justification  [x] A history of present problem with:  [x] no personal factors and/or comorbidities that impact the plan of care;  []1-2 personal factors and/or comorbidities that impact the plan of care  []3 personal factors and/or comorbidities that impact the plan of care  [x] An examination of body systems using standardized tests and measures addressing any of the following: body structures and functions (impairments), activity limitations, and/or participation restrictions;:  [] a total of 1-2 or more elements   [x] a total of 3 or more elements   [] a total of 4 or more elements   [x] A clinical presentation with:  [x] stable and/or uncomplicated characteristics   [] evolving clinical presentation with changing characteristics  [] unstable and unpredictable characteristics;   [x] Clinical decision making of [x] Low, [] moderate, [] high complexity using standardized patient assessment instrument and/or measurable assessment of functional outcome. [x] EVAL (LOW) 11770 (typically 15 minutes face-to-face)  [] EVAL (MOD) 38358 (typically 30 minutes face-to-face)  [] EVAL (HIGH) 63616 (typically 45 minutes face-to-face)  [] RE-EVAL     PLAN:   Frequency/Duration:  1 days per week for 4 Weeks:  Interventions:  [x]  Therapeutic exercise including: strength training, ROM, for Lower extremity and core   [x]  NMR activation and proprioception for LE, Glutes and Core   [x]  Manual therapy as indicated for LE, Hip and spine to include: Dry Needling/IASTM, STM, PROM, Gr I-IV mobilizations, manipulation. [x] Modalities as needed that may include: thermal agents, E-stim, Biofeedback, US, iontophoresis as indicated  [x] Patient education on joint protection, postural re-education, activity modification, progression of HEP. HEP instruction: Written HEP instructions provided and reviewed. GOALS:  Patient stated goal: stregnthen knee  [] Progressing: [] Met: [] Not Met: [] Adjusted    Therapist goals for Patient:   Short Term Goals: To be achieved in: 2 weeks  1. Independent in HEP and progression per patient tolerance, in order to prevent re-injury. [] Progressing: [] Met: [] Not Met: [] Adjusted  2. Patient will have a decrease in pain to 0/10 with squat to chair for improved functional mobiliy for sit-stand. [] Progressing: [] Met: [] Not Met: [] Adjusted    Long Term Goals:  To be achieved in: 4 weeks  1. FOTO score of at least 73 to assist with reaching prior level of function. [] Progressing: [] Met: [] Not Met: [] Adjusted  2. Patient will demonstrate an increase in B hip Strength to at least 5/5 to promote increased dynamic knee stability for ambulating on uneven surfaces. [] Progressing: [] Met: [] Not Met: [] Adjusted  3. Patient will return to reciprocal pattern on steps without pain or c/o \"weakness\" in knee to normalize stair mobility. [] Progressing: [] Met: [] Not Met: [] Adjusted  4. Pt will report no pain or \"weakness\" in R knee ambulating on uneven ground x30 min to complete yard work at [employer]. [] Progressing: [] Met: [] Not Met: [] Adjusted     Electronically signed by:   Leonardo Gustafson PT  DPT ATC

## 2022-05-06 NOTE — FLOWSHEET NOTE
[address] [place] [address]  Phone: [phone]   Fax: [phone]    Physical Therapy Treatment Note/ Progress Report:     Date:  2022    Patient Name:  Garrett Mills    :  1970  MRN: [id number]  Restrictions/Precautions:    Medical/Treatment Diagnosis Information:  · Diagnosis: M17.11 (ICD-10-CM) - Primary osteoarthritis of right kneeS83.241D (ICD-10-CM) - Tear of medial meniscus of right knee, current, unspecified tear type, subsequent encounter  · Treatment Diagnosis: R knee pain, hip weakness  Insurance/Certification information:  PT Insurance Information: cigna, ded not met, no copay, no auth  Physician Information:    Kayode Benz PA-C  Plan of care signed (Y/N): []  Yes [x]  No     Date of Patient follow up with Physician:      Progress Report: []  Yes  [x]  No     Date Range for reporting period:  Beginnin22  Ending:     Progress report due (10 Rx/or 30 days whichever is less): visit #4 or 61 (date)     Recertification due (POC duration/ or 90 days whichever is less): visit #4 or 22 (date)     Visit # Insurance Allowable Auth required?  Date Range   1 BMN []  Yes  []  No          Latex Allergy:  [x]NO      []YES  Preferred Language for Healthcare:   [x]English       []other:    Functional Scale:           Date assessed:  FOTO physical FS primary measure score = 61; risk adjusted = 59  22    Pain level:  0-4/10     SUBJECTIVE:  See eval    OBJECTIVE: See eval      RESTRICTIONS/PRECAUTIONS: hx of 3 cardiac stents, HTN    Exercises/Interventions:     Therapeutic Exercise (01904)  Resistance / level Sets/sec Reps Notes / Cues          SL clam lime 1 10B    SL ABD  1 10B    Lateral walk lime 10 ft 3 laps    Leg press- SL ecc 155# 1 10    SLR  10\" 1                                Therapeutic Activities (89733)       Pt educated on POC, HEP, prognosis, pathoanatomy 10 min      Squat to chair  1 10                  Neuromuscular Re-ed (99653)                            Manual Intervention ([id number])       Knee mobs/PROM       Tib/Fem Mobs       Patella Mobs       Ankle mobs                         Modalities:     Pt. Education:  -pt educated on diagnosis, prognosis and expectations for rehab  -all pt questions were answered    Home Exercise Program:  Access Code: [email or web address]  URL: [email or web address]  Date: 05/06/2022  Prepared by: [facility]    Exercises  Clamshell with Resistance - 1 x daily - 7 x weekly - 3 sets - 10 reps  Supine Active Straight Leg Raise - 1 x daily - 7 x weekly - 1-2 sets - 10 reps - 10 sec hold  Sidelying Hip Abduction - 1 x daily - 7 x weekly - 3 sets - 10 reps  Side Stepping with Resistance at Ankles - 1 x daily - 7 x weekly - 3 sets - 10 reps  Squat with Chair Touch - 1 x daily - 7 x weekly - 3 sets - 10 reps      Therapeutic Exercise and NMR EXR  [x] (01536) Provided verbal/tactile cueing for activities related to strengthening, flexibility, endurance, ROM for improvements in LE, proximal hip, and core control with self care, mobility, lifting, ambulation.  [] (86346) Provided verbal/tactile cueing for activities related to improving balance, coordination, kinesthetic sense, posture, motor skill, proprioception  to assist with LE, proximal hip, and core control in self care, mobility, lifting, ambulation and eccentric single leg control.   [] (19186) Therapist is in constant attendance of 2 or more patients providing skilled therapy interventions, but not providing any significant amount of measurable one-on-one time to either patient, for improvements in LE, proximal hip, and core control in self care, mobility, lifting, ambulation and eccentric single leg control.      NMR and Therapeutic Activities:    [x] (10782 or 20917) Provided verbal/tactile cueing for activities related to improving balance, coordination, kinesthetic sense, posture, motor skill, proprioception and motor activation to allow for proper function of core, proximal hip and LE with self care and ADLs  [] (16916) Gait Re-education- Provided training and instruction to the patient for proper LE, core and proximal hip recruitment and positioning and eccentric body weight control with ambulation re-education including up and down stairs     Home Exercise Program:    [x] (24335) Reviewed/Progressed HEP activities related to strengthening, flexibility, endurance, ROM of core, proximal hip and LE for functional self-care, mobility, lifting and ambulation/stair navigation   [] (19225)Reviewed/Progressed HEP activities related to improving balance, coordination, kinesthetic sense, posture, motor skill, proprioception of core, proximal hip and LE for self care, mobility, lifting, and ambulation/stair navigation      Manual Treatments:  PROM / STM / Oscillations-Mobs:  G-I, II, III, IV (PA's, Inf., Post.)  [] (54238) Provided manual therapy to mobilize LE, proximal hip and/or LS spine soft tissue/joints for the purpose of modulating pain, promoting relaxation,  increasing ROM, reducing/eliminating soft tissue swelling/inflammation/restriction, improving soft tissue extensibility and allowing for proper ROM for normal function with self care, mobility, lifting and ambulation. Modalities:  [] (10340) Vasopneumatic compression: Utilized vasopneumatic compression to decrease edema / swelling for the purpose of improving mobility and quad tone / recruitment which will allow for increased overall function including but not limited to self-care, transfers, ambulation, and ascending / descending stairs.        Charges:  Timed Code Treatment Minutes: 30   Total Treatment Minutes: 49     [x] EVAL - LOW (36493)   [] EVAL - MOD (32077)  [] EVAL - HIGH (12634)  [] RE-EVAL (74373)  [x] YD(12934) x   1    [] Ionto  [] NMR (18868) x       [] Vaso  [] Manual (98759) x       [] Ultrasound  [x] TA x1        [] Mech Traction (00427)  [] Aquatic Therapy x     [] ES (un) (14379): [] Home Management Training x  [] ES(attended) (16200)   [] Dry Needling 1-2 muscles (96219):  [] Dry Needling 3+ muscles (233562  [] Group:      [] Other:     GOALS:  Patient stated goal: stregnthen knee  [] Progressing: [] Met: [] Not Met: [] Adjusted    Therapist goals for Patient:   Short Term Goals: To be achieved in: 2 weeks  1. Independent in HEP and progression per patient tolerance, in order to prevent re-injury. [] Progressing: [] Met: [] Not Met: [] Adjusted  2. Patient will have a decrease in pain to 0/10 with squat to chair for improved functional mobiliy for sit-stand. [] Progressing: [] Met: [] Not Met: [] Adjusted    Long Term Goals: To be achieved in: 4 weeks  1. FOTO score of at least 73 to assist with reaching prior level of function. [] Progressing: [] Met: [] Not Met: [] Adjusted  2. Patient will demonstrate an increase in B hip Strength to at least 5/5 to promote increased dynamic knee stability for ambulating on uneven surfaces. [] Progressing: [] Met: [] Not Met: [] Adjusted  3. Patient will return to reciprocal pattern on steps without pain or c/o \"weakness\" in knee to normalize stair mobility. [] Progressing: [] Met: [] Not Met: [] Adjusted  4. Pt will report no pain or \"weakness\" in R knee ambulating on uneven ground x30 min to complete yard work at [employer]. [] Progressing: [] Met: [] Not Met: [] Adjusted    Overall Progression Towards Functional goals/ Treatment Progress Update:  [] Patient is progressing as expected towards functional goals listed. [] Progression is slowed due to complexities/Impairments listed. [] Progression has been slowed due to co-morbidities.   [x] Plan just implemented, too soon to assess goals progression <30days   [] Goals require adjustment due to lack of progress  [] Patient is not progressing as expected and requires additional follow up with physician  [] Other    Persisting Functional Limitations/Impairments:  []Sitting []Standing   []Walking []Stairs   []Transfers []ADLs   []Squatting/bending []Kneeling  []Housework []Job related tasks  []Driving []Sports/Recreation   []Sleeping []Other:    ASSESSMENT:  See eval  Treatment/Activity Tolerance:  [x] Pt able to complete treatment [] Patient limited by fatique  [] Patient limited by pain  [] Patient limited by other medical complications  [] Other:     Prognosis:  [x] Good [] Fair  [] Poor    Patient Requires Follow-up: [x] Yes  [] No    Return to Play:    [x]  N/A   []  Stage 1: Intro to Strength   []  Stage 2: Return to Run and Strength   []  Stage 3: Return to Jump and Strength   []  Stage 4: Dynamic Strength and Agility   []  Stage 5: Sport Specific Training     []  Ready to Return to Play, Meets All Above Stages   []  Not Ready for Return to Sports   Comments:            PLAN: See eval. PT 1x / week for 4 weeks. [] Continue per plan of care [] Alter current plan (see comments)  [x] Plan of care initiated [] Hold pending MD visit [] Discharge    Electronically signed by: Bailee Goldman PT, DPT ATC      Note: If patient does not return for scheduled/ recommended follow up visits, this note will serve as a discharge from care along with most recent update on progress.

## 2022-05-09 RX ORDER — LOSARTAN POTASSIUM AND HYDROCHLOROTHIAZIDE 12.5; 5 MG/1; MG/1
TABLET ORAL
Qty: 90 TABLET | Refills: 3 | Status: SHIPPED | OUTPATIENT
Start: 2022-05-09

## 2022-05-18 ENCOUNTER — APPOINTMENT (OUTPATIENT)
Dept: PHYSICAL THERAPY | Age: 52
End: 2022-05-18
Payer: COMMERCIAL

## 2022-06-06 RX ORDER — CLOPIDOGREL BISULFATE 75 MG/1
TABLET ORAL
Qty: 90 TABLET | Refills: 1 | Status: SHIPPED | OUTPATIENT
Start: 2022-06-06

## 2022-06-06 NOTE — TELEPHONE ENCOUNTER
Received refill request for Plavix from [facility] pharmacy.     Last ov: 06/17/2021 LES    Last labs: 06/02/2021    Last Refill: 12/03/2021 #90 w/ 1 refill    Next appointment:  none

## 2022-11-23 NOTE — TELEPHONE ENCOUNTER
Future Appointments    Encounter Information    Provider Department Appt Notes   1/24/2023 Judd Levine MD [facility] Internal Medicine Appt Reason: Routine Existing Condition Follow Up   6 months follow up // screened green   Booking Code: [email or web address]     Past Visits    Date Provider Specialty Visit Type Primary Dx   05/05/2022 Judd Levine MD Internal Medicine Office Visit Coronary artery disease of native artery of native heart with stable angina pectoris ([facility] [address]

## 2022-11-24 RX ORDER — CLOPIDOGREL BISULFATE 75 MG/1
TABLET ORAL
Qty: 90 TABLET | Refills: 1 | Status: SHIPPED | OUTPATIENT
Start: 2022-11-24

## 2022-12-30 ENCOUNTER — HOSPITAL ENCOUNTER (OUTPATIENT)
Age: 52
Setting detail: OBSERVATION
Discharge: HOME | End: 2022-12-31
Attending: INTERNAL MEDICINE | Admitting: INTERNAL MEDICINE
Payer: COMMERCIAL

## 2022-12-30 ENCOUNTER — TELEPHONE (OUTPATIENT)
Dept: CARDIOLOGY CLINIC | Age: 52
End: 2022-12-30

## 2022-12-30 ENCOUNTER — APPOINTMENT (OUTPATIENT)
Dept: GENERAL RADIOLOGY | Age: 52
End: 2022-12-30
Payer: COMMERCIAL

## 2022-12-30 DIAGNOSIS — R07.9 CHEST PAIN, UNSPECIFIED TYPE: Primary | ICD-10-CM

## 2022-12-30 LAB
A/G RATIO: 1.6 (ref 1.1–2.2)
ALBUMIN SERPL-MCNC: 4.5 G/DL (ref 3.4–5)
ALP BLD-CCNC: 63 U/L (ref 40–129)
ALT SERPL-CCNC: 42 U/L (ref 10–40)
ANION GAP SERPL CALCULATED.3IONS-SCNC: 11 MMOL/L (ref 3–16)
AST SERPL-CCNC: 31 U/L (ref 15–37)
BASOPHILS ABSOLUTE: 0.1 K/UL (ref 0–0.2)
BASOPHILS RELATIVE PERCENT: 2 %
BILIRUB SERPL-MCNC: 0.7 MG/DL (ref 0–1)
BUN BLDV-MCNC: 20 MG/DL (ref 7–20)
CALCIUM SERPL-MCNC: 10 MG/DL (ref 8.3–10.6)
CHLORIDE BLD-SCNC: 101 MMOL/L (ref 99–110)
CO2: 28 MMOL/L (ref 21–32)
CREAT SERPL-MCNC: 1.2 MG/DL (ref 0.9–1.3)
EOSINOPHILS ABSOLUTE: 0.3 K/UL (ref 0–0.6)
EOSINOPHILS RELATIVE PERCENT: 5.3 %
GFR SERPL CREATININE-BSD FRML MDRD: >60 ML/MIN/{1.73_M2}
GLUCOSE BLD-MCNC: 96 MG/DL (ref 70–99)
HCT VFR BLD CALC: 50.5 % (ref 40.5–52.5)
HEMOGLOBIN: 16.5 G/DL (ref 13.5–17.5)
LYMPHOCYTES ABSOLUTE: 1.8 K/UL (ref 1–5.1)
LYMPHOCYTES RELATIVE PERCENT: 28.5 %
MCH RBC QN AUTO: 29.2 PG (ref 26–34)
MCHC RBC AUTO-ENTMCNC: 32.7 G/DL (ref 31–36)
MCV RBC AUTO: 89.2 FL (ref 80–100)
MONOCYTES ABSOLUTE: 0.6 K/UL (ref 0–1.3)
MONOCYTES RELATIVE PERCENT: 10.2 %
NEUTROPHILS ABSOLUTE: 3.3 K/UL (ref 1.7–7.7)
NEUTROPHILS RELATIVE PERCENT: 54 %
PDW BLD-RTO: 14.2 % (ref 12.4–15.4)
PLATELET # BLD: 171 K/UL (ref 135–450)
PMV BLD AUTO: 9.7 FL (ref 5–10.5)
POTASSIUM REFLEX MAGNESIUM: 4.4 MMOL/L (ref 3.5–5.1)
RBC # BLD: 5.66 M/UL (ref 4.2–5.9)
SODIUM BLD-SCNC: 140 MMOL/L (ref 136–145)
TOTAL PROTEIN: 7.4 G/DL (ref 6.4–8.2)
TROPONIN: <0.01 NG/ML
TROPONIN: <0.01 NG/ML
WBC # BLD: 6.2 K/UL (ref 4–11)

## 2022-12-30 PROCEDURE — G0378 HOSPITAL OBSERVATION PER HR: HCPCS

## 2022-12-30 PROCEDURE — 99285 EMERGENCY DEPT VISIT HI MDM: CPT

## 2022-12-30 PROCEDURE — 80053 COMPREHEN METABOLIC PANEL: CPT

## 2022-12-30 PROCEDURE — 93005 ELECTROCARDIOGRAM TRACING: CPT | Performed by: INTERNAL MEDICINE

## 2022-12-30 PROCEDURE — 84484 ASSAY OF TROPONIN QUANT: CPT

## 2022-12-30 PROCEDURE — 6370000000 HC RX 637 (ALT 250 FOR IP): Performed by: PHYSICIAN ASSISTANT

## 2022-12-30 PROCEDURE — 71045 X-RAY EXAM CHEST 1 VIEW: CPT

## 2022-12-30 PROCEDURE — 85025 COMPLETE CBC W/AUTO DIFF WBC: CPT

## 2022-12-30 PROCEDURE — 36415 COLL VENOUS BLD VENIPUNCTURE: CPT

## 2022-12-30 PROCEDURE — 2580000003 HC RX 258: Performed by: PHYSICIAN ASSISTANT

## 2022-12-30 RX ORDER — ACETAMINOPHEN 325 MG/1
650 TABLET ORAL EVERY 6 HOURS PRN
Status: DISCONTINUED | OUTPATIENT
Start: 2022-12-30 | End: 2022-12-31 | Stop reason: HOSPADM

## 2022-12-30 RX ORDER — ONDANSETRON 2 MG/ML
4 INJECTION INTRAMUSCULAR; INTRAVENOUS EVERY 6 HOURS PRN
Status: DISCONTINUED | OUTPATIENT
Start: 2022-12-30 | End: 2022-12-31 | Stop reason: HOSPADM

## 2022-12-30 RX ORDER — SODIUM CHLORIDE 0.9 % (FLUSH) 0.9 %
10 SYRINGE (ML) INJECTION EVERY 12 HOURS SCHEDULED
Status: DISCONTINUED | OUTPATIENT
Start: 2022-12-30 | End: 2022-12-31 | Stop reason: HOSPADM

## 2022-12-30 RX ORDER — ATORVASTATIN CALCIUM 80 MG/1
80 TABLET, FILM COATED ORAL DAILY
Status: DISCONTINUED | OUTPATIENT
Start: 2022-12-30 | End: 2022-12-30

## 2022-12-30 RX ORDER — LOSARTAN POTASSIUM 100 MG/1
50 TABLET ORAL DAILY
Status: DISCONTINUED | OUTPATIENT
Start: 2022-12-31 | End: 2022-12-31

## 2022-12-30 RX ORDER — SODIUM CHLORIDE 9 MG/ML
INJECTION, SOLUTION INTRAVENOUS PRN
Status: DISCONTINUED | OUTPATIENT
Start: 2022-12-30 | End: 2022-12-31 | Stop reason: HOSPADM

## 2022-12-30 RX ORDER — SENNA PLUS 8.6 MG/1
1 TABLET ORAL DAILY PRN
Status: DISCONTINUED | OUTPATIENT
Start: 2022-12-30 | End: 2022-12-31 | Stop reason: HOSPADM

## 2022-12-30 RX ORDER — SODIUM CHLORIDE 0.9 % (FLUSH) 0.9 %
10 SYRINGE (ML) INJECTION PRN
Status: DISCONTINUED | OUTPATIENT
Start: 2022-12-30 | End: 2022-12-31 | Stop reason: HOSPADM

## 2022-12-30 RX ORDER — CLOPIDOGREL BISULFATE 75 MG/1
75 TABLET ORAL DAILY
Status: DISCONTINUED | OUTPATIENT
Start: 2022-12-31 | End: 2022-12-31 | Stop reason: HOSPADM

## 2022-12-30 RX ORDER — LOSARTAN POTASSIUM AND HYDROCHLOROTHIAZIDE 12.5; 5 MG/1; MG/1
1 TABLET ORAL DAILY
Status: DISCONTINUED | OUTPATIENT
Start: 2022-12-31 | End: 2022-12-30 | Stop reason: CLARIF

## 2022-12-30 RX ORDER — ASPIRIN 81 MG/1
324 TABLET, CHEWABLE ORAL ONCE
Status: COMPLETED | OUTPATIENT
Start: 2022-12-30 | End: 2022-12-30

## 2022-12-30 RX ORDER — NITROGLYCERIN 0.4 MG/1
0.4 TABLET SUBLINGUAL EVERY 5 MIN PRN
Status: DISCONTINUED | OUTPATIENT
Start: 2022-12-30 | End: 2022-12-31 | Stop reason: HOSPADM

## 2022-12-30 RX ORDER — HYDROCHLOROTHIAZIDE 25 MG/1
12.5 TABLET ORAL DAILY
Status: DISCONTINUED | OUTPATIENT
Start: 2022-12-31 | End: 2022-12-31

## 2022-12-30 RX ORDER — ATORVASTATIN CALCIUM 80 MG/1
80 TABLET, FILM COATED ORAL NIGHTLY
Status: DISCONTINUED | OUTPATIENT
Start: 2022-12-30 | End: 2022-12-31 | Stop reason: HOSPADM

## 2022-12-30 RX ORDER — ASPIRIN 81 MG/1
81 TABLET ORAL DAILY
Status: DISCONTINUED | OUTPATIENT
Start: 2022-12-31 | End: 2022-12-31 | Stop reason: HOSPADM

## 2022-12-30 RX ORDER — ENOXAPARIN SODIUM 100 MG/ML
40 INJECTION SUBCUTANEOUS DAILY
Status: DISCONTINUED | OUTPATIENT
Start: 2022-12-31 | End: 2022-12-31 | Stop reason: HOSPADM

## 2022-12-30 RX ORDER — ACETAMINOPHEN 650 MG/1
650 SUPPOSITORY RECTAL EVERY 6 HOURS PRN
Status: DISCONTINUED | OUTPATIENT
Start: 2022-12-30 | End: 2022-12-31 | Stop reason: HOSPADM

## 2022-12-30 RX ADMIN — ATORVASTATIN CALCIUM 80 MG: 80 TABLET, FILM COATED ORAL at 22:19

## 2022-12-30 RX ADMIN — Medication 10 ML: at 21:00

## 2022-12-30 RX ADMIN — ASPIRIN 243 MG: 81 TABLET, CHEWABLE ORAL at 17:46

## 2022-12-30 RX ADMIN — NITROGLYCERIN 1 INCH: 20 OINTMENT TOPICAL at 19:45

## 2022-12-30 ASSESSMENT — LIFESTYLE VARIABLES
HOW MANY STANDARD DRINKS CONTAINING ALCOHOL DO YOU HAVE ON A TYPICAL DAY: PATIENT DOES NOT DRINK
HOW OFTEN DO YOU HAVE A DRINK CONTAINING ALCOHOL: NEVER
HOW OFTEN DO YOU HAVE A DRINK CONTAINING ALCOHOL: NEVER
HOW MANY STANDARD DRINKS CONTAINING ALCOHOL DO YOU HAVE ON A TYPICAL DAY: PATIENT DOES NOT DRINK

## 2022-12-30 ASSESSMENT — HEART SCORE: ECG: 1

## 2022-12-30 ASSESSMENT — PAIN DESCRIPTION - ORIENTATION: ORIENTATION: LEFT

## 2022-12-30 ASSESSMENT — PAIN DESCRIPTION - LOCATION: LOCATION: ARM;CHEST

## 2022-12-30 ASSESSMENT — PAIN - FUNCTIONAL ASSESSMENT: PAIN_FUNCTIONAL_ASSESSMENT: 0-10

## 2022-12-30 ASSESSMENT — PAIN SCALES - WONG BAKER: WONGBAKER_NUMERICALRESPONSE: 2

## 2022-12-30 NOTE — TELEPHONE ENCOUNTER
Spoke to pt, he said he as tingling in his arm and fingers. Pinching and burning in his chest. No SOB. Hasn't exercised all month. BP was 124/68. Please call pt back at [phone].

## 2022-12-30 NOTE — TELEPHONE ENCOUNTER
Spoke to Kanwal Davenport. He reports tingling down left arm, he \"feels\" his heart, and further describes as a burning sensation. No HA, no sob. He states he usually feels \"100%\" so this is very concerning to him. This initially started a few days ago. Pt agreeable to proceed to [facility] ER for further evaluation.

## 2022-12-30 NOTE — ED PROVIDER NOTES
[facility]        Pt Name: Michelina Carrel  MRN: [id number]  [place] 1970  Date of evaluation: 12/30/2022  Provider: David Velasquez PA-C  PCP: Nga Kenny MD  Note Started: 5:47 PM EST 12/30/22          JUSTO. I have evaluated this patient. My supervising physician was available for consultation. CHIEF COMPLAINT       Chief Complaint   Patient presents with    Chest Pain     Pt here with c/o chest pain that radiates to the L arm. He has an extensive family cardiac history and three stents placed himself. He states this has been going on for about a week but got worse today. HISTORY OF PRESENT ILLNESS   (Location, Timing/Onset, Context/Setting, Quality, Duration, Modifying Factors, Severity, Associated Signs and Symptoms)  Note limiting factors. Chief Complaint: Chest pain    Michelina Carrel is a 45 y.o. male who presents to the emergency department with a chief complaint of some chest heaviness that radiates on his left arm that woke him up from sleep 4 days ago. He states it has been intermittent. Most recent episode began earlier this afternoon. He states that while he was walking to the bank he did notice that he was slightly more fatigued and out of it than normal.  He has history of coronary artery disease with 3 stents in December 2020. Has hypertension, hyperlipidemia and strong family history of coronary artery disease with 2 brothers and his father all that had CABG. Patient denies shortness of breath, fevers, cough, abdominal pain, urinary or bowel symptoms. He states the sensation that he is having at this time feels similar to what he had before he had his 3 stents. Rates his symptoms a 2 out of 10. Nursing Notes were all reviewed and agreed with or any disagreements were addressed in the HPI.     REVIEW OF SYSTEMS    (2-9 systems for level 4, 10 or more for level 5)     Review of Systems    Positives and Pertinent negatives as per HPI. Except as noted above in the ROS, all other systems were reviewed and negative. PAST MEDICAL HISTORY     Past Medical History:   Diagnosis Date    Coronary artery disease involving native coronary artery of native heart with angina pectoris ([address]     ~Successful PCI to LAD with 4.0x8, 3.5x38 and 3.0x15 GUALBERTO. PD with 3.75x12 NC to 20atm. Excellent Result    Hyperlipidemia     Hypertension          SURGICAL HISTORY     Past Surgical History:   Procedure Laterality Date    Stalin Finch Nap Laura Melanie    TONSILLECTOMY  [facility] [address]       Previous Medications    ACETAMINOPHEN (TYLENOL) 500 MG TABLET    Take 500 mg by mouth every 6 hours as needed for Pain    AMOXICILLIN (AMOXIL) 500 MG CAPSULE        ASPIRIN EC 81 MG EC TABLET    Take 1 tablet by mouth daily    ATORVASTATIN (LIPITOR) 80 MG TABLET    TAKE 1 TABLET BY MOUTH EVERY DAY    CLOPIDOGREL (PLAVIX) 75 MG TABLET    TAKE 1 TABLET BY MOUTH EVERY DAY    LOSARTAN-HYDROCHLOROTHIAZIDE (HYZAAR) 50-12.5 MG PER TABLET    TAKE 1 TABLET BY MOUTH EVERY DAY    METOPROLOL TARTRATE (LOPRESSOR) 25 MG TABLET    TAKE 1 TABLET BY MOUTH TWICE A DAY         ALLERGIES     Pcn [penicillins] and Lisinopril    FAMILYHISTORY       Family History   Problem Relation Age of Onset    Migraines Mother     High Blood Pressure Father     Heart Surgery Father         cabg x3    Heart Surgery Sister         stents    Heart Surgery Brother         CABG x4    Heart Surgery Brother         CABG x5    Heart Failure Maternal Grandmother     Diabetes Other         aunt, niece          SOCIAL HISTORY       Social History     Tobacco Use    Smoking status: Never    Smokeless tobacco: Never   Vaping Use    Vaping Use: Never used   Substance Use Topics    Alcohol use: Not Currently     Comment: socially, 1 drink of beer monthly    Drug use:  No SCREENINGS        Becky Coma Scale  Eye Opening: Spontaneous  Best Verbal Response: Oriented  Best Motor Response: Obeys commands  Becky Coma Scale Score: 15        Heart Score for chest pain patients  History: Moderately Suspicious  ECG: Non-Specifc repolarization disturbance/LBTB/PM  Patient Age: > 39 and < 65 years  *Risk factors for Atherosclerotic disease: Positive family History, Obesity, Hypertension, Hypercholesterolemia  Risk Factors: > 3 Risk factors or history of atherosclerotic disease*  Troponin: < 1X normal limit  Heart Score Total: 5       CIWA Assessment  BP: 127/82  Heart Rate: 66             PHYSICAL EXAM    (up to 7 for level 4, 8 or more for level 5)     ED Triage Vitals [12/30/22 1643]   BP Temp Temp Source Heart Rate Resp SpO2 Height Weight   127/82 98.7 °F (37.1 °C) Oral 66 18 95 % 6' (1.829 m) 255 lb (115.7 kg)       Physical Exam  Vitals and nursing note reviewed. Constitutional:       Appearance: He is well-developed. He is not diaphoretic. HENT:      Head: Atraumatic. Nose: Nose normal.   Eyes:      General:         Right eye: No discharge. Left eye: No discharge. Cardiovascular:      Rate and Rhythm: Normal rate and regular rhythm. Heart sounds: No murmur heard. No friction rub. No gallop. Pulmonary:      Effort: Pulmonary effort is normal. No respiratory distress. Breath sounds: No stridor. No wheezing, rhonchi or rales. Abdominal:      General: Bowel sounds are normal. There is no distension. Palpations: Abdomen is soft. There is no mass. Tenderness: There is no abdominal tenderness. There is no guarding or rebound. Hernia: No hernia is present. Musculoskeletal:         General: No swelling. Normal range of motion. Cervical back: Normal range of motion. Skin:     General: Skin is warm and dry. Findings: No erythema or rash. Neurological:      Mental Status: He is alert and oriented to person, place, and time. Cranial Nerves: No cranial nerve deficit. Psychiatric:         Behavior: Behavior normal.       DIAGNOSTIC RESULTS   LABS:    Labs Reviewed   COMPREHENSIVE METABOLIC PANEL W/ REFLEX TO MG FOR LOW K - Abnormal; Notable for the following components:       Result Value    ALT 42 (*)     All other components within normal limits   CBC WITH AUTO DIFFERENTIAL   TROPONIN       When ordered only abnormal lab results are displayed. All other labs were within normal range or not returned as of this dictation. EKG: When ordered, EKG's are interpreted by the Emergency Department Physician in the absence of a cardiologist.  Please see their note for interpretation of EKG. RADIOLOGY:   Non-plain film images such as CT, Ultrasound and MRI are read by the radiologist. Plain radiographic images are visualized and preliminarily interpreted by the ED Provider with the below findings:        Interpretation per the Radiologist below, if available at the time of this note:    XR CHEST PORTABLE   Final Result   Hypoventilatory chest with no acute findings. No results found. No results found. PROCEDURES   Unless otherwise noted below, none     Procedures    CRITICAL CARE TIME       CONSULTS:  None      EMERGENCY DEPARTMENT COURSE and DIFFERENTIAL DIAGNOSIS/MDM:   Vitals:    Vitals:    12/30/22 1643   BP: 127/82   Pulse: 66   Resp: 18   Temp: 98.7 °F (37.1 °C)   TempSrc: Oral   SpO2: 95%   Weight: 255 lb (115.7 kg)   Height: 6' (1.829 m)       Patient was given the following medications:  Medications   nitroglycerin (NITRO-BID) 2 % ointment 1 inch (has no administration in time range)   aspirin chewable tablet 324 mg (243 mg Oral Given 12/30/22 1746)         Is this patient to be included in the SEP-1 Core Measure due to severe sepsis or septic shock? No   Exclusion criteria - the patient is NOT to be included for SEP-1 Core Measure due to:   Infection is not suspected    Patient presented with some chest discomfort. It has been intermittent. Last episode began a few hours before presenting to the emergency department. He states this feels similar to symptoms he was having before he had 3 stents. Has some strong family history with his 2 brothers and his father having bypass. He has 3 stents himself along with hypertension and hyperlipidemia. Work-up here is unremarkable. Low suspicion for pulmonary embolus, aortic dissection, esophageal rupture or other emergent etiology. Patient was stable time of admission. FINAL IMPRESSION      1. Chest pain, unspecified type          DISPOSITION/PLAN   DISPOSITION Decision To Admit 12/30/2022 06:59:06 PM      PATIENT REFERRED TO:  No follow-up provider specified.     DISCHARGE MEDICATIONS:  New Prescriptions    No medications on file       DISCONTINUED MEDICATIONS:  Discontinued Medications    No medications on file              (Please note that portions of this note were completed with a voice recognition program.  Efforts were made to edit the dictations but occasionally words are mis-transcribed.)    José Flowers PA-C (electronically signed)            José Flowers PA-C  12/30/22 1944

## 2022-12-30 NOTE — TELEPHONE ENCOUNTER
Pt called in stating that earlier this week you started tingling that comes and goes in the finger stips and forearm Pt also states sometimes he feels pressure and burning sensation. Pt is requesting a call back and appt.  Pt can be reached at   [phone]

## 2022-12-31 VITALS
SYSTOLIC BLOOD PRESSURE: 121 MMHG | HEIGHT: 72 IN | DIASTOLIC BLOOD PRESSURE: 78 MMHG | BODY MASS INDEX: 34.54 KG/M2 | OXYGEN SATURATION: 92 % | RESPIRATION RATE: 19 BRPM | TEMPERATURE: 97.9 F | HEART RATE: 77 BPM | WEIGHT: 255 LBS

## 2022-12-31 LAB
ANION GAP SERPL CALCULATED.3IONS-SCNC: 10 MMOL/L (ref 3–16)
BUN BLDV-MCNC: 19 MG/DL (ref 7–20)
CALCIUM SERPL-MCNC: 9.4 MG/DL (ref 8.3–10.6)
CHLORIDE BLD-SCNC: 103 MMOL/L (ref 99–110)
CHOLESTEROL, TOTAL: 135 MG/DL (ref 0–199)
CO2: 24 MMOL/L (ref 21–32)
CREAT SERPL-MCNC: 1.2 MG/DL (ref 0.9–1.3)
EKG ATRIAL RATE: 71 BPM
EKG DIAGNOSIS: NORMAL
EKG P AXIS: 22 DEGREES
EKG P-R INTERVAL: 124 MS
EKG Q-T INTERVAL: 380 MS
EKG QRS DURATION: 76 MS
EKG QTC CALCULATION (BAZETT): 412 MS
EKG R AXIS: 13 DEGREES
EKG T AXIS: 28 DEGREES
EKG VENTRICULAR RATE: 71 BPM
GFR SERPL CREATININE-BSD FRML MDRD: >60 ML/MIN/{1.73_M2}
GLUCOSE BLD-MCNC: 123 MG/DL (ref 70–99)
HCT VFR BLD CALC: 45.8 % (ref 40.5–52.5)
HDLC SERPL-MCNC: 29 MG/DL (ref 40–60)
HEMOGLOBIN: 15.5 G/DL (ref 13.5–17.5)
LDL CHOLESTEROL CALCULATED: 66 MG/DL
MCH RBC QN AUTO: 29.9 PG (ref 26–34)
MCHC RBC AUTO-ENTMCNC: 33.9 G/DL (ref 31–36)
MCV RBC AUTO: 88.2 FL (ref 80–100)
PDW BLD-RTO: 14 % (ref 12.4–15.4)
PLATELET # BLD: 168 K/UL (ref 135–450)
PMV BLD AUTO: 10 FL (ref 5–10.5)
POTASSIUM REFLEX MAGNESIUM: 4.1 MMOL/L (ref 3.5–5.1)
RBC # BLD: 5.19 M/UL (ref 4.2–5.9)
SODIUM BLD-SCNC: 137 MMOL/L (ref 136–145)
TRIGL SERPL-MCNC: 199 MG/DL (ref 0–150)
TROPONIN: <0.01 NG/ML
VLDLC SERPL CALC-MCNC: 40 MG/DL
WBC # BLD: 6.8 K/UL (ref 4–11)

## 2022-12-31 PROCEDURE — 80061 LIPID PANEL: CPT

## 2022-12-31 PROCEDURE — 80048 BASIC METABOLIC PNL TOTAL CA: CPT

## 2022-12-31 PROCEDURE — 6370000000 HC RX 637 (ALT 250 FOR IP): Performed by: PHYSICIAN ASSISTANT

## 2022-12-31 PROCEDURE — 78452 HT MUSCLE IMAGE SPECT MULT: CPT

## 2022-12-31 PROCEDURE — A9502 TC99M TETROFOSMIN: HCPCS | Performed by: INTERNAL MEDICINE

## 2022-12-31 PROCEDURE — 99214 OFFICE O/P EST MOD 30 MIN: CPT | Performed by: INTERNAL MEDICINE

## 2022-12-31 PROCEDURE — G0378 HOSPITAL OBSERVATION PER HR: HCPCS

## 2022-12-31 PROCEDURE — 3430000000 HC RX DIAGNOSTIC RADIOPHARMACEUTICAL: Performed by: INTERNAL MEDICINE

## 2022-12-31 PROCEDURE — 85027 COMPLETE CBC AUTOMATED: CPT

## 2022-12-31 PROCEDURE — 93010 ELECTROCARDIOGRAM REPORT: CPT | Performed by: INTERNAL MEDICINE

## 2022-12-31 PROCEDURE — 93017 CV STRESS TEST TRACING ONLY: CPT | Performed by: INTERNAL MEDICINE

## 2022-12-31 PROCEDURE — 36415 COLL VENOUS BLD VENIPUNCTURE: CPT

## 2022-12-31 RX ORDER — NITROGLYCERIN 0.4 MG/1
0.4 TABLET SUBLINGUAL EVERY 5 MIN PRN
Qty: 25 TABLET | Refills: 3 | Status: SHIPPED | OUTPATIENT
Start: 2022-12-31

## 2022-12-31 RX ADMIN — TETROFOSMIN 10 MILLICURIE: 1.38 INJECTION, POWDER, LYOPHILIZED, FOR SOLUTION INTRAVENOUS at 08:04

## 2022-12-31 RX ADMIN — TETROFOSMIN 30 MILLICURIE: 1.38 INJECTION, POWDER, LYOPHILIZED, FOR SOLUTION INTRAVENOUS at 09:13

## 2022-12-31 RX ADMIN — ASPIRIN 81 MG: 81 TABLET, COATED ORAL at 12:19

## 2022-12-31 RX ADMIN — METOPROLOL TARTRATE 25 MG: 25 TABLET, FILM COATED ORAL at 12:19

## 2022-12-31 RX ADMIN — CLOPIDOGREL BISULFATE 75 MG: 75 TABLET ORAL at 12:19

## 2022-12-31 ASSESSMENT — PAIN DESCRIPTION - DESCRIPTORS: DESCRIPTORS: SHARP

## 2022-12-31 ASSESSMENT — PAIN DESCRIPTION - DIRECTION: RADIATING_TOWARDS: LEFT ARM

## 2022-12-31 ASSESSMENT — PAIN SCALES - GENERAL: PAINLEVEL_OUTOF10: 2

## 2022-12-31 ASSESSMENT — PAIN DESCRIPTION - LOCATION: LOCATION: CHEST

## 2022-12-31 ASSESSMENT — PAIN DESCRIPTION - ORIENTATION: ORIENTATION: LEFT;UPPER

## 2022-12-31 ASSESSMENT — PAIN DESCRIPTION - PAIN TYPE: TYPE: ACUTE PAIN

## 2022-12-31 NOTE — PROGRESS NOTES
Data- discharge order received, pt verbalized agreement to discharge, disposition to previous residence, no needs for HHC/DME. Action- discharge instructions prepared and given to pt, pt verbalized understanding. Medication information packet given r/t NEW and/or CHANGED prescriptions emphasizing name/purpose/side effects, pt verbalized understanding. Discharge instruction summary: Diet- cardiac, Activity- as chelsy, Primary Care Physician as follows: Yung Guevara -[phone] f/u appointment , immunizations reviewed, prescription medications filled [facility].     1. WEIGHT: Admit Weight: 255 lb (115.7 kg) (12/30/22 1643)        Today  Weight: 255 lb (115.7 kg) (12/31/22 0600)       2. O2 SAT.: SpO2: 92 % (12/31/22 0930)    Response- Pt belongings gathered, IV removed. Disposition is home (no HHC/DME needs), transported with self, ambulated to lobby, no complications.

## 2022-12-31 NOTE — PROGRESS NOTES
Patient instructed on Vernon Protocol Stress Test Procedure including possible side effects and adverse reactions. Verbalizes knowledge and understanding and denies having any questions. Patient: Miguel Gracia    Procedure Summary     Date: 04/20/22 Room / Location: [facility] ENDOSCOPY 05 / [facility] ENDOSCOPY    Anesthesia Start: 8269 Anesthesia Stop: 7968    Procedure: BRONCHOSCOPY with transbronchial biospy (N/A ) Diagnosis:       Pulmonary infiltrates      (friability to airways)    Surgeons: Eugenia Diez MD Anesthesiologist: Gretchen Marmolejo CRNA    Anesthesia Type: MAC ASA Status: 4          Anesthesia Type: MAC    Vitals Value Taken Time   /72 04/20/22 0936   Temp 97 04/20/22 0936   Pulse 62 04/20/22 0936   Resp 16 04/20/22 0936   SpO2 98 04/20/22 0936       [facility] AN Post Evaluation:   Patient Evaluated in PACU  Patient Participation: complete - patient participated  Level of Consciousness: awake  Pain Score: 0  Pain Management: adequate  Airway Patency:patent  Dental exam unchanged from preop  Yes    Cardiovascular Status: acceptable  Respiratory Status: acceptable and nasal cannula  Postoperative Hydration acceptable      John Starks CRNA  4/20/2022 9:36 AM

## 2022-12-31 NOTE — CONSULTS
[address] [facility]  [phone]      Chief Complaint   Patient presents with    Chest Pain     Pt here with c/o chest pain that radiates to the L arm. He has an extensive family cardiac history and three stents placed himself. He states this has been going on for about a week but got worse today. History of Present Illness:  Zach King is a 45 y.o. patient who presented to the hospital with complaints of chest pain. He has L sided chest pinching, squeezing and pressure for the past few weeks. He has numbness in his L arm. Symptoms begin with exercise/activity and resolve with rest. He had a stress test today. He had the same SOB and Chest pain during exercise. He states pain is similar to his pain prior to his stents 2 years ago. I have been asked to provide consultation regarding further management and testing. Past Medical History:   has a past medical history of Coronary artery disease involving native coronary artery of native heart with angina pectoris ([address] Hyperlipidemia, and Hypertension. Surgical History:   has a past surgical history that includes Tonsillectomy and adenoidectomy (1974); Cardiac catheterization; and Coronary angioplasty with stent. Social History:   reports that he has never smoked. He has never used smokeless tobacco. He reports that he does not currently use alcohol. He reports that he does not use drugs. Family History:  family history includes Diabetes in an other family member; Heart Failure in his maternal grandmother; Heart Surgery in his brother, brother, father, and sister; High Blood Pressure in his father; Migraines in his mother. Home Medications:  Were reviewed and are listed in nursing record. and/or listed below  Prior to Admission medications    Medication Sig Start Date End Date Taking?  Authorizing Provider   clopidogrel (PLAVIX) 75 MG tablet TAKE 1 TABLET BY MOUTH EVERY DAY 11/24/22   Bill Mars MD   metoprolol tartrate (LOPRESSOR) 25 MG tablet TAKE 1 TABLET BY MOUTH TWICE A DAY 11/23/22   M Jodie Nageotte, MD   losartan-hydroCHLOROthiazide (HYZAAR) 50-12.5 MG per tablet TAKE 1 TABLET BY MOUTH EVERY DAY 5/9/22   Alexey Crespo MD   atorvastatin (LIPITOR) 80 MG tablet TAKE 1 TABLET BY MOUTH EVERY DAY 5/5/22   M Jodie Nageotte, MD   acetaminophen (TYLENOL) 500 MG tablet Take 500 mg by mouth every 6 hours as needed for Pain    Historical Provider, MD   aspirin EC 81 MG EC tablet Take 1 tablet by mouth daily 4/7/20   Alexey Crespo MD        Current Medications:  Current Facility-Administered Medications   Medication Dose Route Frequency Provider Last Rate Last Admin    aspirin EC tablet 81 mg  81 mg Oral Daily Katerina Carty PA-C        clopidogrel (PLAVIX) tablet 75 mg  75 mg Oral Daily Katerina Carty PA-C        metoprolol tartrate (LOPRESSOR) tablet 25 mg  25 mg Oral BID Katerina Carty PA-C        sodium chloride flush 0.9 % injection 10 mL  10 mL IntraVENous 2 times per day Mamadou Finely, PA-C   10 mL at 12/30/22 2100    sodium chloride flush 0.9 % injection 10 mL  10 mL IntraVENous PRN Mamadou Finely, PA-C        0.9 % sodium chloride infusion   IntraVENous PRN Mamadou Finely, PA-SAMEERA        ondansetron [facility]) injection 4 mg  4 mg IntraVENous Q6H PRN Mamadou Finely, PAJARAD        acetaminophen (TYLENOL) tablet 650 mg  650 mg Oral Q6H PRN Mamadou FinelyNATASHA        Or    acetaminophen (TYLENOL) suppository 650 mg  650 mg Rectal Q6H PRN Mamadou Finely, PA-C        senna (SENOKOT) tablet 8.6 mg  1 tablet Oral Daily PRN Mamadou Finely, NATASHA        nitroGLYCERIN (NITROSTAT) SL tablet 0.4 mg  0.4 mg SubLINGual Q5 Min PRN Mamadou Finely, PA-SAMEERA        enoxaparin (LOVENOX) injection 40 mg  40 mg SubCUTAneous Daily Katerina Carty PA-C        losartan (COZAAR) tablet 50 mg  50 mg Oral Daily Katerina Carty PA-C        hydroCHLOROthiazide (HYDRODIURIL) tablet 12.5 mg  12.5 mg Oral Daily Jaylene Mauro PA-C        atorvastatin (LIPITOR) tablet 80 mg  80 mg Oral Nightly SHANE Pro-C   80 mg at 12/30/22 5838        Allergies:  Pcn [penicillins] and Lisinopril     Review of Systems:     Constitutional: there has been no unanticipated weight loss. There's been no change in energy level, sleep pattern, or activity level. Eyes: No visual changes or diplopia. No scleral icterus. ENT: No Headaches, hearing loss or vertigo. No mouth sores or sore throat. Cardiovascular: Reviewed in HPI  Respiratory: No cough or wheezing, no sputum production. No hematemesis. Gastrointestinal: No abdominal pain, appetite loss, blood in stools. No change in bowel or bladder habits. Genitourinary: No dysuria, trouble voiding, or hematuria. Musculoskeletal:  No gait disturbance, weakness or joint complaints. Integumentary: No rash or pruritis. Neurological: No headache, diplopia, change in muscle strength, numbness or tingling. No change in gait, balance, coordination, mood, affect, memory, mentation, behavior. Psychiatric: No anxiety, no depression. Endocrine: No malaise, fatigue or temperature intolerance. No excessive thirst, fluid intake, or urination. No tremor. Hematologic/Lymphatic: No abnormal bruising or bleeding, blood clots or swollen lymph nodes. Allergic/Immunologic: No nasal congestion or hives.       Physical Examination:    Vitals:    12/31/22 0930   BP: 121/78   Pulse: 77   Resp: 19   Temp: 97.9 °F (36.6 °C)   SpO2: 92%    Weight: 255 lb (115.7 kg)         General Appearance:  Alert, cooperative, no distress, appears stated age   Head:  Normocephalic, without obvious abnormality, atraumatic   Eyes:  PERRL, conjunctiva/corneas clear       Nose: Nares normal, no drainage or sinus tenderness   Throat: Lips, mucosa, and tongue normal   Neck: Supple, symmetrical, trachea midline, no adenopathy, thyroid: not enlarged, symmetric, no tenderness/mass/nodules, no carotid bruit or JVD Lungs:   Clear to auscultation bilaterally, respirations unlabored   Chest Wall:  No tenderness or deformity   Heart:  Regular rate and rhythm, S1, S2 normal, no murmur, rub or gallop   Abdomen:   Soft, non-tender, bowel sounds active all four quadrants,  no masses, no organomegaly           Extremities: Extremities normal, atraumatic, no cyanosis or edema   Pulses: 2+ and symmetric   Skin: Skin color, texture, turgor normal, no rashes or lesions   Pysch: Normal mood and affect   Neurologic: Normal gross motor and sensory exam.         Labs  CBC:   Lab Results   Component Value Date/Time    WBC 6.8 12/31/2022 05:22 AM    RBC 5.19 12/31/2022 05:22 AM    HGB 15.5 12/31/2022 05:22 AM    HCT 45.8 12/31/2022 05:22 AM    MCV 88.2 12/31/2022 05:22 AM    RDW 14.0 12/31/2022 05:22 AM     12/31/2022 05:22 AM     CMP:    Lab Results   Component Value Date/Time     12/31/2022 05:22 AM    K 4.1 12/31/2022 05:22 AM     12/31/2022 05:22 AM    CO2 24 12/31/2022 05:22 AM    BUN 19 12/31/2022 05:22 AM    CREATININE 1.2 12/31/2022 05:22 AM    GFRAA >60 06/02/2021 07:35 AM    GFRAA >60 03/12/2010 11:36 AM    AGRATIO 1.6 12/30/2022 05:47 PM    LABGLOM >60 12/31/2022 05:22 AM    GLUCOSE 123 12/31/2022 05:22 AM    PROT 7.4 12/30/2022 05:47 PM    CALCIUM 9.4 12/31/2022 05:22 AM    BILITOT 0.7 12/30/2022 05:47 PM    ALKPHOS 63 12/30/2022 05:47 PM    AST 31 12/30/2022 05:47 PM    ALT 42 12/30/2022 05:47 PM     PT/INR:  No results found for: PTINR  Lab Results   Component Value Date    CKTOTAL 298 09/18/2018    TROPONINI <0.01 12/30/2022       EKG:  I have reviewed EKG with the following interpretation:  Impression:  Normal sinus rhythmNonspecific ST abnormalityAbnormal ECG   6/30/20    Summary     Normal myocardial perfusion. Normal LV size and systolic function. Abnormal EKG response with 1 mm ST depression.      Pt has CAD with following history:  Cardiac Cath PCI:  EF 50 with mild anterior hypokinesis  100% RCA  70% prox LAD,90% mid LAD,60-70% distal LAD  Collaterals Lft to right    Anatomy:   LAD-prox 90%, mid 80%, distal 80%  RCA prox 100%  RPDA L to R collaterals to RPDA        Intervention  ~Successful PCI to LAD with 4.0x8, 3.5x38 and 3.0x15 GUALBERTO. PD with 3.75x12 NC to 20atm. Excellent Result. Stress 12/22  Summary    There is normal isotope uptake at stress and rest. There is no evidence of    myocardial ischemia or scar. Normal myocardial perfusion study. Normal LV size and systolic function. Abnormal ECG portion of stress test with 2 mm ST depression consistent with    ischemia. Overall findings represent a intermediate risk scan. All testing and labs listed below were personally reviewed. Assessment  Patient Active Problem List   Diagnosis    Essential hypertension    Obesity (BMI 30-39. 9)    Hyperlipidemia    Prediabetes    Coronary artery disease of native artery of native heart with stable angina pectoris ([facility] [address]    Chest pain         Plan:    I had the opportunity to review the clinical symptoms and presentation of James Plunkett. Assessment/Plan:  Principal Problem:    Chest pain  Plan: Typical angina. Stress test shows normal perfusion but abnormal ecg and symptoms c/w ischemia during stress. Concern for recurrent ischemic CAD. Based on these findings I recommend left heart cath for definitive evaluation of coronary arteries. Risks, benefits, expectations, and alternative treatments were discussed. Questions appropriately answered. James Plunkett agrees to proceed and verbalized understanding. Patient wants to return as outpatient for [facility]. Start nitro sl    CAD: aspirin, plavix, statin, lopressor  HTN: low bp, stop losartan and hctz. OK to dc home. FU as outpatient for planned [facility]    I will address the patient's cardiac risk factors and adjusted pharmacologic treatment as needed.  In addition, I have reinforced the need for patient directed risk factor modification. Tobacco use was discussed with the patient and educated on the negative effects. I have asked the patient to not utilize these agents. Thank you for allowing to us to participate in the care or Natasha Lopez. Further evaluation will be based upon the patient's clinical course and testing results. All questions and concerns were addressed to the patient/family. Alternatives to my treatment were discussed. The note was completed using EMR. Every effort was made to ensure accuracy; however, inadvertent computerized transcription errors may be present.     Ernie De MD,  12/31/2022 11:14 AM

## 2022-12-31 NOTE — H&P
Hospital Medicine History & Physical      Patient Name: Luis Doty    : 1970    PCP: Bin Greenwood MD    Date of Service:  Patient seen and examined on 2022     Chief Complaint:  Chest discomfort    History Of Present Illness:    Luis Doty is a 45 y.o. male who presented to ED for evaluation of chest heaviness radiating down his left arm. He reports the pain woke him up from his sleep 4 days ago. Pain has been intermittent and has waxed and waned in severity. Patient reports that today he walked to the bank and noticed he was slightly more fatigued than usual.  He also noted palpitations and slight shortness of breath. He denies dizziness, cough, edema, abdominal pain, nausea, vomiting, diarrhea. He has a history of CAD with 3 stents placed 2 years ago and reports chest heaviness he is having currently feels similar to what he had before when stents were placed. Past Medical History:    Patient has a past medical history of Coronary artery disease involving native coronary artery of native heart with angina pectoris ([address] Hyperlipidemia, and Hypertension. Past Surgical History:    Patient has a past surgical history that includes Tonsillectomy and adenoidectomy (); Cardiac catheterization; and Coronary angioplasty with stent. Medications Prior to Admission:      Prior to Admission medications    Medication Sig Start Date End Date Taking?  Authorizing Provider   clopidogrel (PLAVIX) 75 MG tablet TAKE 1 TABLET BY MOUTH EVERY DAY 22   Pippa Hunter MD   metoprolol tartrate (LOPRESSOR) 25 MG tablet TAKE 1 TABLET BY MOUTH TWICE A DAY 22   Bin Greenwood MD   losartan-hydroCHLOROthiazide (HYZAAR) 50-12.5 MG per tablet TAKE 1 TABLET BY MOUTH EVERY DAY 22   Bin Greenwood MD   atorvastatin (LIPITOR) 80 MG tablet TAKE 1 TABLET BY MOUTH EVERY DAY 22   CECILIA Park MD   acetaminophen (TYLENOL) 500 MG tablet Take 500 mg by mouth every 6 hours as needed for Pain    Historical Provider, MD   aspirin EC 81 MG EC tablet Take 1 tablet by mouth daily 4/7/20   Tiffanie Jacome MD       Allergies:  Pcn [penicillins] and Lisinopril    Social History:      TOBACCO:   reports that he has never smoked. He has never used smokeless tobacco.  ETOH:   reports that he does not currently use alcohol. DRUGS:  reports no history of drug use. Family History:      Reviewed in detail positive as follows:        Problem Relation Age of Onset    Migraines Mother     High Blood Pressure Father     Heart Surgery Father         cabg x3    Heart Surgery Sister         stents    Heart Surgery Brother         CABG x4    Heart Surgery Brother         CABG x5    Heart Failure Maternal Grandmother     Diabetes Other         aunt, niece       REVIEW OF SYSTEMS:   Pertinent positives as noted in the HPI. All other systems reviewed and negative. PHYSICAL EXAM PERFORMED:    /69   Pulse 68   Temp 97.5 °F (36.4 °C) (Oral)   Resp 16   Ht 6' (1.829 m)   Wt 255 lb (115.7 kg)   SpO2 92%   BMI 34.58 kg/m²     General appearance:  Awake, alert, no apparent distress  HEENT:  Normocephalic, atraumatic without obvious deformity. PERRL. EOM intact. Conjunctivae/corneas clear. Neck: Supple, with full range of motion. No JVD. Trachea midline. Respiratory:  Clear to auscultation bilaterally without rales, wheezes, or rhonchi. Normal respiratory effort. Cardiovascular:  Regular rate and rhythm without murmurs, rubs or gallops. Abdomen: Soft, NT, ND, without rebound or guarding. Normal bowel sounds. Extremities:  No clubbing, cyanosis, or edema bilaterally. Full range of motion without deformity. +2 palpable pulses, equal bilaterally. Capillary refill brisk,< 3 seconds   Skin: No rashes or lesions. Warm/dry. Neurologic:  Neurovascularly intact without any focal sensory/motor deficits. Cranial nerves: II-XII intact, grossly non-focal. Alert and oriented x 3.  Normal speech. Psychiatric:  Thought content appropriate, normal insight. Labs:   CBC   Recent Labs     12/30/22  1747   WBC 6.2   HGB 16.5   HCT 50.5           RENAL  Recent Labs     12/30/22  1747      K 4.4      CO2 28   BUN 20   CREATININE 1.2       LFTS  Recent Labs     12/30/22 1747   AST 31   ALT 42*   BILITOT 0.7   ALKPHOS 63       COAG  No results for input(s): INR in the last 72 hours. CARDIAC ENZYMES  Recent Labs     12/30/22 1747 12/30/22 2123 12/30/22  2338   TROPONINI <0.01 <0.01 <0.01       LIPIDS  Cholesterol, Total   Date/Time Value Ref Range Status   06/02/2021 07:35  0 - 199 mg/dL Final     Triglycerides   Date/Time Value Ref Range Status   06/02/2021 07:35  (H) 0 - 150 mg/dL Final     HDL   Date/Time Value Ref Range Status   06/02/2021 07:35 AM 35 (L) 40 - 60 mg/dL Final   03/12/2010 11:36 AM 39 (L) 40 - 60 mg/dl Final     LDL Calculated   Date/Time Value Ref Range Status   06/02/2021 07:35  (H) <100 mg/dL Final         Radiology:     XR CHEST PORTABLE   Final Result   Hypoventilatory chest with no acute findings. NM Cardiac Stress Test Nuclear Imaging    (Results Pending)           ASSESSMENT/PLAN:    Chest pain  ECG shows NSR, nonspecific ST/T changes  Initial troponin negative, will trend x 2 more draws  ASA, statin  NTG for chest pain PRN  Stress test in am  Check lipid panel   Cardiology consulted      CAD s/p PCI  Continue home ASA, plavix, BB, statin    HTN  Continue home metoprolol, losartan/HCTZ      DVT prophylaxis: Lovenox  Probiotic if on abx: N/A    Diet: Diet NPO Exceptions are: Sips of Water with Meds  Code Status: Full Code    Consults:  IP CONSULT TO CARDIOLOGY    Disposition: Place in observation  ELOS: Less than two midnights    Gerald Justin PA-C    Thank you Destiny Soriano MD for the opportunity to be involved in this patient's care. If you have any questions or concerns please feel free to contact me at [phone].

## 2022-12-31 NOTE — PROGRESS NOTES
Pt tele sent to ER, pt annamaria to follow without tele. Called ER twice to send tele back to be placed on patient.

## 2022-12-31 NOTE — PROGRESS NOTES
Pt admitted to room 3319 from ED via [employer]. Pt denies any pain at this time. POC updated with pt, all questions answered. Oriented pt to room and call light. Call light and bedside table within reach. Instructed to call out with any needs.

## 2023-01-03 DIAGNOSIS — R07.9 CHEST PAIN, UNSPECIFIED TYPE: Primary | ICD-10-CM

## 2023-01-12 ENCOUNTER — HOSPITAL ENCOUNTER (OUTPATIENT)
Dept: CARDIAC CATH/INVASIVE PROCEDURES | Age: 53
Discharge: HOME | End: 2023-01-12
Attending: INTERNAL MEDICINE | Admitting: INTERNAL MEDICINE
Payer: COMMERCIAL

## 2023-01-12 VITALS
HEART RATE: 56 BPM | HEIGHT: 72 IN | RESPIRATION RATE: 18 BRPM | TEMPERATURE: 98.4 F | DIASTOLIC BLOOD PRESSURE: 88 MMHG | WEIGHT: 255 LBS | BODY MASS INDEX: 34.54 KG/M2 | SYSTOLIC BLOOD PRESSURE: 136 MMHG | OXYGEN SATURATION: 98 %

## 2023-01-12 DIAGNOSIS — I25.83 CORONARY ARTERY DISEASE DUE TO LIPID RICH PLAQUE: Primary | ICD-10-CM

## 2023-01-12 DIAGNOSIS — I25.10 CORONARY ARTERY DISEASE DUE TO LIPID RICH PLAQUE: Primary | ICD-10-CM

## 2023-01-12 LAB
ANION GAP SERPL CALCULATED.3IONS-SCNC: 12 MMOL/L (ref 3–16)
BUN BLDV-MCNC: 19 MG/DL (ref 7–20)
CALCIUM SERPL-MCNC: 9 MG/DL (ref 8.3–10.6)
CHLORIDE BLD-SCNC: 109 MMOL/L (ref 99–110)
CO2: 24 MMOL/L (ref 21–32)
CREAT SERPL-MCNC: 1.2 MG/DL (ref 0.9–1.3)
EKG ATRIAL RATE: 58 BPM
EKG DIAGNOSIS: NORMAL
EKG P AXIS: 4 DEGREES
EKG P-R INTERVAL: 148 MS
EKG Q-T INTERVAL: 420 MS
EKG QRS DURATION: 84 MS
EKG QTC CALCULATION (BAZETT): 412 MS
EKG R AXIS: -3 DEGREES
EKG T AXIS: 4 DEGREES
EKG VENTRICULAR RATE: 58 BPM
GFR SERPL CREATININE-BSD FRML MDRD: >60 ML/MIN/{1.73_M2}
GLUCOSE BLD-MCNC: 109 MG/DL (ref 70–99)
HCT VFR BLD CALC: 48.6 % (ref 40.5–52.5)
HEMOGLOBIN: 16.2 G/DL (ref 13.5–17.5)
LEFT VENTRICULAR EJECTION FRACTION MODE: NORMAL
LV EF: 55 %
MCH RBC QN AUTO: 29.6 PG (ref 26–34)
MCHC RBC AUTO-ENTMCNC: 33.5 G/DL (ref 31–36)
MCV RBC AUTO: 88.5 FL (ref 80–100)
PDW BLD-RTO: 14.1 % (ref 12.4–15.4)
PLATELET # BLD: 181 K/UL (ref 135–450)
PMV BLD AUTO: 10.1 FL (ref 5–10.5)
POC ACT LR: 307 SEC
POC ACT LR: 362 SEC
POTASSIUM SERPL-SCNC: 4.6 MMOL/L (ref 3.5–5.1)
RBC # BLD: 5.49 M/UL (ref 4.2–5.9)
SODIUM BLD-SCNC: 145 MMOL/L (ref 136–145)
WBC # BLD: 5.5 K/UL (ref 4–11)

## 2023-01-12 PROCEDURE — 93010 ELECTROCARDIOGRAM REPORT: CPT | Performed by: INTERNAL MEDICINE

## 2023-01-12 PROCEDURE — 85347 COAGULATION TIME ACTIVATED: CPT

## 2023-01-12 PROCEDURE — C1874 STENT, COATED/COV W/DEL SYS: HCPCS

## 2023-01-12 PROCEDURE — 2709999900 HC NON-CHARGEABLE SUPPLY

## 2023-01-12 PROCEDURE — 6360000002 HC RX W HCPCS

## 2023-01-12 PROCEDURE — 93005 ELECTROCARDIOGRAM TRACING: CPT | Performed by: INTERNAL MEDICINE

## 2023-01-12 PROCEDURE — 99152 MOD SED SAME PHYS/QHP 5/>YRS: CPT

## 2023-01-12 PROCEDURE — C1769 GUIDE WIRE: HCPCS

## 2023-01-12 PROCEDURE — 6360000004 HC RX CONTRAST MEDICATION: Performed by: INTERNAL MEDICINE

## 2023-01-12 PROCEDURE — 80048 BASIC METABOLIC PNL TOTAL CA: CPT

## 2023-01-12 PROCEDURE — C1894 INTRO/SHEATH, NON-LASER: HCPCS

## 2023-01-12 PROCEDURE — 92928 PRQ TCAT PLMT NTRAC ST 1 LES: CPT | Performed by: INTERNAL MEDICINE

## 2023-01-12 PROCEDURE — 85027 COMPLETE CBC AUTOMATED: CPT

## 2023-01-12 PROCEDURE — C1725 CATH, TRANSLUMIN NON-LASER: HCPCS

## 2023-01-12 PROCEDURE — C1887 CATHETER, GUIDING: HCPCS

## 2023-01-12 PROCEDURE — 93458 L HRT ARTERY/VENTRICLE ANGIO: CPT

## 2023-01-12 PROCEDURE — 92928 PRQ TCAT PLMT NTRAC ST 1 LES: CPT

## 2023-01-12 PROCEDURE — 2500000003 HC RX 250 WO HCPCS

## 2023-01-12 PROCEDURE — 99153 MOD SED SAME PHYS/QHP EA: CPT

## 2023-01-12 PROCEDURE — 36415 COLL VENOUS BLD VENIPUNCTURE: CPT

## 2023-01-12 PROCEDURE — 93458 L HRT ARTERY/VENTRICLE ANGIO: CPT | Performed by: INTERNAL MEDICINE

## 2023-01-12 PROCEDURE — 99152 MOD SED SAME PHYS/QHP 5/>YRS: CPT | Performed by: INTERNAL MEDICINE

## 2023-01-12 RX ORDER — EVOLOCUMAB 140 MG/ML
140 INJECTION, SOLUTION SUBCUTANEOUS
Qty: 2.1 ML | Refills: 5 | Status: SHIPPED | OUTPATIENT
Start: 2023-01-12

## 2023-01-12 RX ORDER — SODIUM CHLORIDE 9 MG/ML
INJECTION, SOLUTION INTRAVENOUS PRN
Status: DISCONTINUED | OUTPATIENT
Start: 2023-01-12 | End: 2023-01-12 | Stop reason: HOSPADM

## 2023-01-12 RX ORDER — SODIUM CHLORIDE 0.9 % (FLUSH) 0.9 %
5-40 SYRINGE (ML) INJECTION PRN
Status: DISCONTINUED | OUTPATIENT
Start: 2023-01-12 | End: 2023-01-12 | Stop reason: HOSPADM

## 2023-01-12 RX ORDER — SODIUM CHLORIDE 9 MG/ML
INJECTION, SOLUTION INTRAVENOUS CONTINUOUS
Status: DISCONTINUED | OUTPATIENT
Start: 2023-01-12 | End: 2023-01-12 | Stop reason: HOSPADM

## 2023-01-12 RX ORDER — ACETAMINOPHEN 325 MG/1
650 TABLET ORAL EVERY 4 HOURS PRN
Status: DISCONTINUED | OUTPATIENT
Start: 2023-01-12 | End: 2023-01-12 | Stop reason: HOSPADM

## 2023-01-12 RX ORDER — ONDANSETRON 2 MG/ML
4 INJECTION INTRAMUSCULAR; INTRAVENOUS EVERY 6 HOURS PRN
Status: DISCONTINUED | OUTPATIENT
Start: 2023-01-12 | End: 2023-01-12 | Stop reason: HOSPADM

## 2023-01-12 RX ORDER — SODIUM CHLORIDE 0.9 % (FLUSH) 0.9 %
5-40 SYRINGE (ML) INJECTION EVERY 12 HOURS SCHEDULED
Status: DISCONTINUED | OUTPATIENT
Start: 2023-01-12 | End: 2023-01-12 | Stop reason: HOSPADM

## 2023-01-12 RX ADMIN — IOPAMIDOL 128 ML: 755 INJECTION, SOLUTION INTRAVENOUS at 10:24

## 2023-01-12 NOTE — H&P
[facility]   H&P  [phone]      No chief complaint on file. CP      History of Present Illness:  Mayra Escoto is a 45 y.o. patient who presented to the hospital with complaints of chest pain. He has L sided chest pinching, squeezing and pressure for the past few weeks. He has numbness in his L arm. Symptoms begin with exercise/activity and resolve with rest. He had a stress test today. He had the same SOB and Chest pain during exercise. He states pain is similar to his pain prior to his stents 2 years ago. I have been asked to provide consultation regarding further management and testing. Past Medical History:   has a past medical history of Coronary artery disease involving native coronary artery of native heart with angina pectoris ([address] Hyperlipidemia, and Hypertension. Surgical History:   has a past surgical history that includes Tonsillectomy and adenoidectomy (1974); Cardiac catheterization; and Coronary angioplasty with stent. Social History:   reports that he has never smoked. He has never used smokeless tobacco. He reports that he does not currently use alcohol. He reports that he does not use drugs. Family History:  family history includes Diabetes in an other family member; Heart Failure in his maternal grandmother; Heart Surgery in his brother, brother, father, and sister; High Blood Pressure in his father; Migraines in his mother. Home Medications:  Were reviewed and are listed in nursing record. and/or listed below  Prior to Admission medications    Medication Sig Start Date End Date Taking? Authorizing Provider   nitroGLYCERIN (NITROSTAT) 0.4 MG SL tablet Place 1 tablet under the tongue every 5 minutes as needed for Chest pain up to max of 3 total doses.  If no relief after 1 dose, call 911. 12/31/22   Kimber Stratton MD   clopidogrel (PLAVIX) 75 MG tablet TAKE 1 TABLET BY MOUTH EVERY DAY 11/24/22   Olayinka Diane MD   metoprolol tartrate (LOPRESSOR) 25 MG tablet TAKE 1 TABLET BY MOUTH TWICE A DAY 11/23/22   M Erum Astorga MD   atorvastatin (LIPITOR) 80 MG tablet TAKE 1 TABLET BY MOUTH EVERY DAY 5/5/22   Ju Luke MD   aspirin EC 81 MG EC tablet Take 1 tablet by mouth daily 4/7/20   Ju Luke MD        Current Medications:  Current Facility-Administered Medications   Medication Dose Route Frequency Provider Last Rate Last Admin    sodium chloride flush 0.9 % injection 5-40 mL  5-40 mL IntraVENous PRN Ignacio Ward MD            Allergies:  Pcn [penicillins] and Lisinopril     Review of Systems:     Constitutional: there has been no unanticipated weight loss. There's been no change in energy level, sleep pattern, or activity level. Eyes: No visual changes or diplopia. No scleral icterus. ENT: No Headaches, hearing loss or vertigo. No mouth sores or sore throat. Cardiovascular: Reviewed in HPI  Respiratory: No cough or wheezing, no sputum production. No hematemesis. Gastrointestinal: No abdominal pain, appetite loss, blood in stools. No change in bowel or bladder habits. Genitourinary: No dysuria, trouble voiding, or hematuria. Musculoskeletal:  No gait disturbance, weakness or joint complaints. Integumentary: No rash or pruritis. Neurological: No headache, diplopia, change in muscle strength, numbness or tingling. No change in gait, balance, coordination, mood, affect, memory, mentation, behavior. Psychiatric: No anxiety, no depression. Endocrine: No malaise, fatigue or temperature intolerance. No excessive thirst, fluid intake, or urination. No tremor. Hematologic/Lymphatic: No abnormal bruising or bleeding, blood clots or swollen lymph nodes. Allergic/Immunologic: No nasal congestion or hives. Physical Examination:    There were no vitals filed for this visit.              General Appearance:  Alert, cooperative, no distress, appears stated age   Head:  Normocephalic, without obvious abnormality, atraumatic   Eyes:  PERRL, conjunctiva/corneas clear       Nose: Nares normal, no drainage or sinus tenderness   Throat: Lips, mucosa, and tongue normal   Neck: Supple, symmetrical, trachea midline, no adenopathy, thyroid: not enlarged, symmetric, no tenderness/mass/nodules, no carotid bruit or JVD       Lungs:   Clear to auscultation bilaterally, respirations unlabored   Chest Wall:  No tenderness or deformity   Heart:  Regular rate and rhythm, S1, S2 normal, no murmur, rub or gallop   Abdomen:   Soft, non-tender, bowel sounds active all four quadrants,  no masses, no organomegaly           Extremities: Extremities normal, atraumatic, no cyanosis or edema   Pulses: 2+ and symmetric   Skin: Skin color, texture, turgor normal, no rashes or lesions   Pysch: Normal mood and affect   Neurologic: Normal gross motor and sensory exam.         Labs  CBC:   Lab Results   Component Value Date/Time    WBC 6.8 12/31/2022 05:22 AM    RBC 5.19 12/31/2022 05:22 AM    HGB 15.5 12/31/2022 05:22 AM    HCT 45.8 12/31/2022 05:22 AM    MCV 88.2 12/31/2022 05:22 AM    RDW 14.0 12/31/2022 05:22 AM     12/31/2022 05:22 AM     CMP:    Lab Results   Component Value Date/Time     12/31/2022 05:22 AM    K 4.1 12/31/2022 05:22 AM     12/31/2022 05:22 AM    CO2 24 12/31/2022 05:22 AM    BUN 19 12/31/2022 05:22 AM    CREATININE 1.2 12/31/2022 05:22 AM    GFRAA >60 06/02/2021 07:35 AM    GFRAA >60 03/12/2010 11:36 AM    AGRATIO 1.6 12/30/2022 05:47 PM    LABGLOM >60 12/31/2022 05:22 AM    GLUCOSE 123 12/31/2022 05:22 AM    PROT 7.4 12/30/2022 05:47 PM    CALCIUM 9.4 12/31/2022 05:22 AM    BILITOT 0.7 12/30/2022 05:47 PM    ALKPHOS 63 12/30/2022 05:47 PM    AST 31 12/30/2022 05:47 PM    ALT 42 12/30/2022 05:47 PM     PT/INR:  No results found for: PTINR  Lab Results   Component Value Date    CKTOTAL 298 09/18/2018    TROPONINI <0.01 12/30/2022       EKG:  I have reviewed EKG with the following interpretation:  Impression:  Normal sinus rhythmNonspecific ST abnormalityAbnormal ECG   6/30/20    Summary     Normal myocardial perfusion. Normal LV size and systolic function. Abnormal EKG response with 1 mm ST depression. Pt has CAD with following history:  Cardiac Cath PCI:  EF 50 with mild anterior hypokinesis  100% RCA  70% prox LAD,90% mid LAD,60-70% distal LAD  Collaterals Lft to right    Anatomy:   LAD-prox 90%, mid 80%, distal 80%  RCA prox 100%  RPDA L to R collaterals to RPDA        Intervention  ~Successful PCI to LAD with 4.0x8, 3.5x38 and 3.0x15 GUALBERTO. PD with 3.75x12 NC to 20atm. Excellent Result. Stress 12/22  Summary    There is normal isotope uptake at stress and rest. There is no evidence of    myocardial ischemia or scar. Normal myocardial perfusion study. Normal LV size and systolic function. Abnormal ECG portion of stress test with 2 mm ST depression consistent with    ischemia. Overall findings represent a intermediate risk scan. All testing and labs listed below were personally reviewed. Assessment  Patient Active Problem List   Diagnosis    Essential hypertension    Obesity (BMI 30-39. 9)    Hyperlipidemia    Prediabetes    Coronary artery disease of native artery of native heart with stable angina pectoris ([facility] [address]    Chest pain         Plan:    I had the opportunity to review the clinical symptoms and presentation of Albina Astorga. Assessment/Plan:  Principal Problem:    Chest pain  Plan: Typical angina. Stress test shows normal perfusion but abnormal ecg and symptoms c/w ischemia during stress. Concern for recurrent ischemic CAD. Based on these findings I recommend left heart cath for definitive evaluation of coronary arteries. Risks, benefits, expectations, and alternative treatments were discussed. Questions appropriately answered. Albina Astorga agrees to proceed and verbalized understanding. Patient wants to return as outpatient for [facility].  Start nitro sl    CAD: aspirin, plavix, statin, lopressor  HTN: low bp, stop losartan and hctz. I have reviewed the history and physical and examined the patient and find no relevant changes in the history and physical exam, including heart and lung sounds  I have reviewed with the patient and/or family the risks, benefits, and alternatives to the procedure. Based on these findings I recommend left heart cath for definitive evaluation of coronary arteries. Risks, benefits, expectations, and alternative treatments were discussed. Questions appropriately answered. Gregory Washington agrees to proceed and verbalized understanding.        Maryam Valdez MD 1/12/2023 8:46 AM      Maryam Valdez MD,  1/12/2023 8:45 AM

## 2023-01-12 NOTE — PRE SEDATION
Brief Pre-Op Note/Sedation Assessment      Severa Rink  1970  [id number]  8:44 AM    Planned Procedure: Cardiac Catheterization Procedure  Post Procedure Plan: Return to same level of care  Consent: I have discussed with the patient and/or the patient representative the indication, alternatives, and the possible risks and/or complications of the planned procedure and the anesthesia methods. The patient and/or patient representative appear to understand and agree to proceed. Chief Complaint:   Chest Pain/Pressure      Indications for Cath Procedure:  Presentation:  New Onset Angina <= 2 months and Stable Known CAD  2. Anginal Classification within 2 weeks:  CCS II - Slight limitation, with angina only during vigorous physical activity  3. Angina Symptoms Assessment:  Typical Chest Pain  4. Heart Failure Class within last 2 weeks:  No symptoms  5. Cardiovascular Instability:  No    Prior Ischemic Workup/Eval:  Pre-Procedural Medications: Yes: Aspirin, Beta Blockers, and STATIN  2. Stress Test Completed? Yes:  Stress or Imaging Studies Performed (within ANY time period):   Type:  Stress Nuclear  Results:  Positive:  Ischemia on ECG Extent of Ischemia:  Intermediate    Does Patient need surgery? Cath Valve Surgery:  No    Pre-Procedure Medical History:  Vital Signs: There were no vitals taken for this visit. Allergies: Allergies   Allergen Reactions    Pcn [Penicillins] Nausea Only    Lisinopril Other (See Comments)     cough     Medications:    Current Facility-Administered Medications   Medication Dose Route Frequency Provider Last Rate Last Admin    sodium chloride flush 0.9 % injection 5-40 mL  5-40 mL IntraVENous PRN Jovany Monson MD           Past Medical History:    Past Medical History:   Diagnosis Date    Coronary artery disease involving native coronary artery of native heart with angina pectoris ([address]     ~Successful PCI to LAD with 4.0x8, 3.5x38 and 3.0x15 GUALBERTO.  PD with 3.75x12 NC to 20atm. Excellent Result    Hyperlipidemia     Hypertension        Surgical History:    Past Surgical History:   Procedure Laterality Date    Inocente Soulier DrPauletta Luna Evie Sox    TONSILLECTOMY AND ADENOIDECTOMY  1974             Pre-Sedation:  Pre-Sedation Documentation and Exam:  I have personally completed a history, physical exam & review of systems for this patient (see notes). Prior History of Anesthesia Complications:   none    Modified Mallampati:  II (soft palate, uvula, fauces visible)    ASA Classification:  Class 2 - A normal healthy patient with mild systemic disease    Kermit Scale: Activity:  2 - Able to move 4 extremities voluntarily on command  Respiration:  2 - Able to breathe deeply and cough freely  Circulation:  2 - BP+/- 20mmHg of normal  Consciousness:  2 - Fully awake  Oxygen Saturation (color):  2 - Able to maintain oxygen saturation >92% on room air    Sedation/Anesthesia Plan:  Guard the patient's safety and welfare. Minimize physical discomfort and pain. Minimize negative psychological responses to treatment by providing sedation and analgesia and maximize the potential amnesia. Patient to meet pre-procedure discharge plan.     Medication Planned:  midazolam intravenously and fentanyl intravenously    Patient is an appropriate candidate for plan of sedation:   yes      Electronically signed by Ivanna Robbins MD on 1/12/2023 at 8:44 AM

## 2023-01-12 NOTE — OP NOTE
Operative Note    Patient:  Soni Spaulding   :   1970    Procedural Summary  ~Consent:   Obtained written and verbal consent      Risks/benefits explained in detail  ~Procedure:    Left Heart Catheterization  ~Medications:    Procedural sedation with minimal conscious sedation  ~Complications:   None  ~Blood Loss:    <10cc  ~Specimens:    None obtained  ~Pre-sedation re-evaluation: Performed immediately prior to procedure. Medication and Procedural Reconciliation:  An independent trained observer pushed medications at my direction. We monitored the patient's level of consciousness and vital signs/physiologic status throughout the procedure duration (see start and stop times below). Sedation: 4 mg Versed, 100 mcg Fentanyl  Sedation start: 812  Sedation stop: 835    Cardiac Cath PCI:  Anatomy:   LM-nml   LAD-stents patent, distal 50%  Cx-mid 40%  OM1- prox 99%  OM2 prox 90% small vessel  RCA-ostial   RPDA- L to R collaterals  LVEF- 55  LVG- nml  LVEDP- 5    Intervention  ~Successful PCI to OM1 with 2.75x23 GUALBERTO. Excellent Result. Contrast: 80  Flouro Time: 2.2  Access: R radial a    Impression  ~Coronary Angiography w/ severe multi vessel CAD  ~LVG with LVEF of 55 and no regional wall motion abnormalities  ~Successful complex angioplasty and stenting of om1        Recommendation  ~Aggressive medical treatment and risk factor modification  ~1. Post cath IVF. Bedrest.   2. Recommend beta blocker, high potency statin, aspirin and plavix. No ace/arb required given normal LVEF. 3. Referral to outpatient cardiac rehab phase II will be deferred until patient follow-up in office and then determine patient safety and appropriateness to proceed  4. Patient has been advised on the importance of regular exercise of at least 20-30 minutes daily. 5. Patient counseled about and offered assistance for smoking cessation   6.  Follow up in 2 weeks with cardiology            Belle Garza MD, MD 2023 10:09 AM

## 2023-01-13 ENCOUNTER — TELEPHONE (OUTPATIENT)
Dept: CARDIOLOGY CLINIC | Age: 53
End: 2023-01-13

## 2023-01-24 ENCOUNTER — OFFICE VISIT (OUTPATIENT)
Dept: INTERNAL MEDICINE CLINIC | Age: 53
End: 2023-01-24
Payer: COMMERCIAL

## 2023-01-24 VITALS
HEART RATE: 60 BPM | DIASTOLIC BLOOD PRESSURE: 86 MMHG | WEIGHT: 265.6 LBS | OXYGEN SATURATION: 96 % | BODY MASS INDEX: 36.02 KG/M2 | SYSTOLIC BLOOD PRESSURE: 134 MMHG

## 2023-01-24 DIAGNOSIS — Z12.5 SCREENING FOR MALIGNANT NEOPLASM OF PROSTATE: ICD-10-CM

## 2023-01-24 DIAGNOSIS — Z12.11 COLON CANCER SCREENING: ICD-10-CM

## 2023-01-24 DIAGNOSIS — I25.118 CORONARY ARTERY DISEASE OF NATIVE ARTERY OF NATIVE HEART WITH STABLE ANGINA PECTORIS (HCC): ICD-10-CM

## 2023-01-24 DIAGNOSIS — I10 ESSENTIAL HYPERTENSION: Primary | ICD-10-CM

## 2023-01-24 DIAGNOSIS — R73.03 PREDIABETES: ICD-10-CM

## 2023-01-24 LAB — PROSTATE SPECIFIC ANTIGEN: 0.79 NG/ML (ref 0–4)

## 2023-01-24 PROCEDURE — 3075F SYST BP GE 130 - 139MM HG: CPT | Performed by: INTERNAL MEDICINE

## 2023-01-24 PROCEDURE — 99214 OFFICE O/P EST MOD 30 MIN: CPT | Performed by: INTERNAL MEDICINE

## 2023-01-24 PROCEDURE — 3079F DIAST BP 80-89 MM HG: CPT | Performed by: INTERNAL MEDICINE

## 2023-01-24 ASSESSMENT — ENCOUNTER SYMPTOMS
VOMITING: 0
ABDOMINAL PAIN: 0
TROUBLE SWALLOWING: 0
WHEEZING: 0
SHORTNESS OF BREATH: 0
NAUSEA: 0
VOICE CHANGE: 0

## 2023-01-24 ASSESSMENT — PATIENT HEALTH QUESTIONNAIRE - PHQ9
SUM OF ALL RESPONSES TO PHQ QUESTIONS 1-9: 0
SUM OF ALL RESPONSES TO PHQ QUESTIONS 1-9: 0
SUM OF ALL RESPONSES TO PHQ9 QUESTIONS 1 & 2: 0
2. FEELING DOWN, DEPRESSED OR HOPELESS: 0
1. LITTLE INTEREST OR PLEASURE IN DOING THINGS: 0
SUM OF ALL RESPONSES TO PHQ QUESTIONS 1-9: 0
SUM OF ALL RESPONSES TO PHQ QUESTIONS 1-9: 0

## 2023-01-24 NOTE — PROGRESS NOTES
Annel Bruce  1970  male  45 y.o. SUBJECTIVE:       Chief Complaint   Patient presents with    Follow-up    Other     Stent placement 1/12/23. Patient feeling well - starting back exercising. Hypertension       HPI:  Follow-up visit. Towards the end of December patient went to the hospital for multiple angina symptoms. Afterwards he underwent a stress test and angiogram.  He is a status post couple of new coronary stent. He denies having any more chest pain. Denies shortness of breath wheezing. Multiple family members with coronary artery disease. Patient has not started Repatha yet. Patient came with lab work done at work. Latest hemoglobin A1c is 5.7 on December 13    Past Medical History:   Diagnosis Date    Coronary artery disease involving native coronary artery of native heart with angina pectoris ([address]     ~Successful PCI to LAD with 4.0x8, 3.5x38 and 3.0x15 GUALBERTO. PD with 3.75x12 NC to 20atm.  Excellent Result    Hyperlipidemia     Hypertension      Past Surgical History:   Procedure Laterality Date    [facility]    [facility] ADENOIDECTOMY  1974     Social History     Socioeconomic History    Marital status:      Spouse name: None    Number of children: 3    Years of education: None    Highest education level: None   Occupational History    Occupation:      Comment: [address]    Occupation: Dept Mgr   Tobacco Use    Smoking status: Never    Smokeless tobacco: Never   Vaping Use    Vaping Use: Never used   Substance and Sexual Activity    Alcohol use: Not Currently     Comment: socially, 1 drink of beer monthly    Drug use: No    Sexual activity: Yes     Partners: Female     Family History   Problem Relation Age of Onset    Migraines Mother     High Blood Pressure Father     Heart Surgery Father         cabg x3    Heart Surgery Sister         stents    Heart Surgery Brother CABG x4    Heart Surgery Brother         CABG x5    Heart Failure Maternal Grandmother     Diabetes Other         aunt, niece       Review of Systems   Constitutional:  Negative for diaphoresis and unexpected weight change. HENT:  Negative for trouble swallowing and voice change. Respiratory:  Negative for shortness of breath and wheezing. Cardiovascular:  Negative for chest pain and palpitations. Gastrointestinal:  Negative for abdominal pain, nausea and vomiting. Neurological:  Negative for dizziness and light-headedness. OBJECTIVE:  Pulse Readings from Last 4 Encounters:   01/24/23 60   01/12/23 56   12/31/22 77   05/05/22 62     Wt Readings from Last 4 Encounters:   01/24/23 265 lb 9.6 oz (120.5 kg)   01/12/23 255 lb (115.7 kg)   12/31/22 255 lb (115.7 kg)   05/05/22 258 lb 9.6 oz (117.3 kg)     BP Readings from Last 4 Encounters:   01/24/23 134/86   01/12/23 136/88   12/31/22 121/78   05/05/22 136/88     Physical Exam  Vitals and nursing note reviewed. Constitutional:       Appearance: Normal appearance. He is not ill-appearing. Eyes:      General: No scleral icterus. Conjunctiva/sclera: Conjunctivae normal.   Cardiovascular:      Rate and Rhythm: Normal rate and regular rhythm. Pulses: Normal pulses. Heart sounds: Normal heart sounds. Pulmonary:      Effort: Pulmonary effort is normal.      Breath sounds: Normal breath sounds. Abdominal:      General: Bowel sounds are normal.   Musculoskeletal:      Right lower leg: No edema. Left lower leg: No edema. Neurological:      General: No focal deficit present. Mental Status: He is alert and oriented to person, place, and time.        CBC:   Lab Results   Component Value Date/Time    WBC 5.5 01/12/2023 08:48 AM    HGB 16.2 01/12/2023 08:48 AM    HCT 48.6 01/12/2023 08:48 AM     01/12/2023 08:48 AM     CMP:  Lab Results   Component Value Date/Time     01/12/2023 08:48 AM    K 4.6 01/12/2023 08:48 AM K 4.1 12/31/2022 05:22 AM     01/12/2023 08:48 AM    CO2 24 01/12/2023 08:48 AM    ANIONGAP 12 01/12/2023 08:48 AM    GLUCOSE 109 01/12/2023 08:48 AM    BUN 19 01/12/2023 08:48 AM    CREATININE 1.2 01/12/2023 08:48 AM    GFRAA >60 06/02/2021 07:35 AM    GFRAA >60 03/12/2010 11:36 AM    CALCIUM 9.0 01/12/2023 08:48 AM    PROT 7.4 12/30/2022 05:47 PM    LABALBU 4.5 12/30/2022 05:47 PM    AGRATIO 1.6 12/30/2022 05:47 PM    BILITOT 0.7 12/30/2022 05:47 PM    ALKPHOS 63 12/30/2022 05:47 PM    ALT 42 12/30/2022 05:47 PM    AST 31 12/30/2022 05:47 PM    GLOB 2.2 06/02/2021 07:35 AM     URINALYSIS:  Lab Results   Component Value Date/Time    GLUCOSEU Negative 09/19/2017 10:47 AM    GLUCOSEU NEGATIVE 03/12/2010 11:36 AM    KETUA Negative 09/19/2017 10:47 AM    SPECGRAV 1.011 09/19/2017 10:47 AM    BLOODU Negative 09/19/2017 10:47 AM    PHUR 6.0 09/19/2017 10:47 AM    PROTEINU Negative 09/19/2017 10:47 AM    NITRU Negative 09/19/2017 10:47 AM    LEUKOCYTESUR Negative 09/19/2017 10:47 AM    LABMICR Not Indicated 09/19/2017 10:47 AM    URINETYPE Not Specified 09/19/2017 10:47 AM     HBA1C:   Lab Results   Component Value Date/Time    LABA1C 5.6 12/11/2020 12:00 AM    .9 09/18/2018 08:42 AM     MICRO/ALB:   Lab Results   Component Value Date/Time    LABMICR Not Indicated 09/19/2017 10:47 AM     LIPID:  Lab Results   Component Value Date/Time    CHOL 135 12/31/2022 05:22 AM    TRIG 199 12/31/2022 05:22 AM    HDL 29 12/31/2022 05:22 AM    HDL 39 03/12/2010 11:36 AM    LDLCALC 66 12/31/2022 05:22 AM    LABVLDL 40 12/31/2022 05:22 AM     TSH:   Lab Results   Component Value Date/Time    TSHREFLEX 1.85 01/25/2017 09:32 AM     PSA:   Lab Results   Component Value Date/Time    PSA 0.67 06/02/2021 07:35 AM        ASSESSMENT/PLAN:  Assessment/Plan:  Tl Hernandez was seen today for follow-up, other and hypertension. Diagnoses and all orders for this visit:  Patient never pursue colonoscopy. Cologuard order is placed. Risk-benefit explained. Essential hypertension    Colon cancer screening  -     Fecal DNA Colorectal cancer screening (Cologuard)    Coronary artery disease of native artery of native heart with stable angina pectoris ([address]  And hypertension. At present no angina. Patient denies any exertional chest pain, dyspnea, palpitations, syncope, orthopnea, edema or paroxysmal nocturnal dyspnea. Low HDL. Prediabetes  Therapeutic lifestyle changes   Continue aspirin, Plavix, metoprolol, atorvastatin, Repatha    Screening for malignant neoplasm of prostate  -     PSA Screening; Future          Orders Placed This Encounter   Procedures    Fecal DNA Colorectal cancer screening (Cologuard)    PSA Screening     Standing Status:   Future     Standing Expiration Date:   1/24/2024    Hemoglobin A1C     Standing Status:   Future     Standing Expiration Date:   1/24/2024     Current Outpatient Medications   Medication Sig Dispense Refill    nitroGLYCERIN (NITROSTAT) 0.4 MG SL tablet Place 1 tablet under the tongue every 5 minutes as needed for Chest pain up to max of 3 total doses. If no relief after 1 dose, call 911. 25 tablet 3    clopidogrel (PLAVIX) 75 MG tablet TAKE 1 TABLET BY MOUTH EVERY DAY 90 tablet 1    metoprolol tartrate (LOPRESSOR) 25 MG tablet TAKE 1 TABLET BY MOUTH TWICE A  tablet 1    atorvastatin (LIPITOR) 80 MG tablet TAKE 1 TABLET BY MOUTH EVERY DAY 90 tablet 3    aspirin EC 81 MG EC tablet Take 1 tablet by mouth daily 90 tablet 1    Evolocumab (REPATHA) SOSY syringe Inject 1 mL into the skin every 14 days (Patient not taking: Reported on 1/24/2023) 2.1 mL 5     No current facility-administered medications for this visit. Return in about 3 months (around 4/24/2023). An After Visit Summary was printed and given to the patient. Documentation was done using voice recognition dragon software.   Every effort was made to ensure accuracy; however, inadvertent  Unintentional computerized transcription errors may be present.

## 2023-01-30 ENCOUNTER — OFFICE VISIT (OUTPATIENT)
Dept: CARDIOLOGY CLINIC | Age: 53
End: 2023-01-30
Payer: COMMERCIAL

## 2023-01-30 VITALS
BODY MASS INDEX: 35.84 KG/M2 | HEIGHT: 72 IN | OXYGEN SATURATION: 97 % | WEIGHT: 264.6 LBS | DIASTOLIC BLOOD PRESSURE: 82 MMHG | SYSTOLIC BLOOD PRESSURE: 132 MMHG | HEART RATE: 65 BPM

## 2023-01-30 DIAGNOSIS — E78.2 MIXED HYPERLIPIDEMIA: ICD-10-CM

## 2023-01-30 DIAGNOSIS — I25.10 CORONARY ARTERY DISEASE INVOLVING NATIVE CORONARY ARTERY OF NATIVE HEART WITHOUT ANGINA PECTORIS: Primary | ICD-10-CM

## 2023-01-30 DIAGNOSIS — I10 PRIMARY HYPERTENSION: ICD-10-CM

## 2023-01-30 PROCEDURE — 3074F SYST BP LT 130 MM HG: CPT | Performed by: NURSE PRACTITIONER

## 2023-01-30 PROCEDURE — 3078F DIAST BP <80 MM HG: CPT | Performed by: NURSE PRACTITIONER

## 2023-01-30 PROCEDURE — 99214 OFFICE O/P EST MOD 30 MIN: CPT | Performed by: NURSE PRACTITIONER

## 2023-01-30 NOTE — PROGRESS NOTES
[address] [address]     Outpatient Follow Up Note    CHIEF COMPLAINT / HPI: Hospital Follow Up secondary to status post coronary angioplasty    Hospital record has been reviewed  Hospital Course progressed as follows per discharge summary:  elective    #1) 12/30 - 12/31/22     45 y.o. male who presented to ED for evaluation of chest heaviness radiating down his left arm. He reports the pain woke him up from his sleep . ED work up showed troponin < 0.01. Stress test was done : normal perfusion but abnormal ecg and symptoms c/w ischemia during stress  The pt was evaluated by Cardiology who advised Left heart catheterization   He was dc home with sl nitro. With follow up with Cardiology in 1 week     #2) 1/12/23: Successful PCI to OM1 with 2.75x23 GUALBERTO. Excellent Result. Attila Rosales is 45 y.o. male who presents today for a routine follow up after a recent hospitalization related to the above mentioned issues. He recalls having a burning and pinching  Subjective:   Since the time of discharge, the patient admits their symptoms have improved. The first couple of days he felt a little achy. He started exercising a week later, treadmill with an incline and felt pretty good. The pain / tingling he had is gone. There is no SOB/SLATER. The patient is not experiencing palpitations. These symptoms are improving over the last many days. With regard to medication therapy the patient has been compliant with prescribed regimen. They have tolerated therapy to date. Past Medical History:   Diagnosis Date    Coronary artery disease involving native coronary artery of native heart with angina pectoris ([address]     ~Successful PCI to LAD with 4.0x8, 3.5x38 and 3.0x15 GUALBERTO. PD with 3.75x12 NC to 20atm.  Excellent Result    Hyperlipidemia     Hypertension      Social History:    Social History     Tobacco Use   Smoking Status Never   Smokeless Tobacco Never     Current Medications:  Current Outpatient Medications   Medication Sig Dispense Refill    Evolocumab (REPATHA) SOSY syringe Inject 1 mL into the skin every 14 days 2.1 mL 5    clopidogrel (PLAVIX) 75 MG tablet TAKE 1 TABLET BY MOUTH EVERY DAY 90 tablet 1    metoprolol tartrate (LOPRESSOR) 25 MG tablet TAKE 1 TABLET BY MOUTH TWICE A  tablet 1    atorvastatin (LIPITOR) 80 MG tablet TAKE 1 TABLET BY MOUTH EVERY DAY 90 tablet 3    aspirin EC 81 MG EC tablet Take 1 tablet by mouth daily 90 tablet 1    nitroGLYCERIN (NITROSTAT) 0.4 MG SL tablet Place 1 tablet under the tongue every 5 minutes as needed for Chest pain up to max of 3 total doses. If no relief after 1 dose, call 911. (Patient not taking: Reported on 1/30/2023) 25 tablet 3     No current facility-administered medications for this visit. REVIEW OF SYSTEMS:   CONSTITUTIONAL: No major weight gain or loss, fatigue, weakness, night sweats or fever. There's been no change in energy level, sleep pattern, or activity level. HEENT: No new vision difficulties or ringing in the ears. RESPIRATORY: No new SOB, PND, orthopnea or cough. CARDIOVASCULAR: See HPI  GI: No nausea, vomiting, diarrhea, constipation, abdominal pain or changes in bowel habits. : No urinary frequency, urgency, incontinence hematuria or dysuria. SKIN: No cyanosis or skin lesions. MUSCULOSKELETAL: No new muscle or joint pain. NEUROLOGICAL: No syncope or TIA-like symptoms.   PSYCHIATRIC: No anxiety, pain, insomnia or depression    Objective:   PHYSICAL EXAM:    Vitals:    01/30/23 1000 01/30/23 1023   BP: 110/60 132/82   Site: Right Upper Arm Right Upper Arm   Position: Sitting    Cuff Size: Large Adult Large Adult   Pulse: 65    SpO2: 97%    Weight: 264 lb 9.6 oz (120 kg)    Height: 6' (1.829 m)          VITALS:  /60 (Site: Right Upper Arm, Position: Sitting, Cuff Size: Large Adult)   Pulse 65   Ht 6' (1.829 m)   Wt 264 lb 9.6 oz (120 kg)   SpO2 97%   BMI 35.89 kg/m²     CONSTITUTIONAL: Cooperative, no apparent distress, and appears well nourished / developed  NEUROLOGIC:  Awake and orientated to person, place and time. PSYCH: Calm affect. SKIN: Warm and dry. HEENT: Sclera non-icteric, normocephalic, neck supple, no elevation of JVP, normal carotid pulses with no bruits and thyroid normal size. LUNGS:  No increased work of breathing and clear to auscultation, no crackles or wheezing. CARDIOVASCULAR:  Regular rate 68 and rhythm with no murmurs, gallops, rubs, or abnormal heart sounds, normal PMI. The apical impulses not displaced. Heart tones are crisp and normal                                                                                          Cervical veins are not engorged                 JVP less than 8 cm H2O                                                                              The carotid upstroke is normal in amplitude and contour without delay or bruit    ABDOMEN:  Normal bowel sounds, non-distended and non-tender to palpation   EXT: No edema, no calf tenderness. Pulses are present bilaterally.     DATA:    Lab Results   Component Value Date    ALT 42 (H) 12/30/2022    AST 31 12/30/2022    ALKPHOS 63 12/30/2022    BILITOT 0.7 12/30/2022     Lab Results   Component Value Date    CREATININE 1.2 01/12/2023    BUN 19 01/12/2023     01/12/2023    K 4.6 01/12/2023     01/12/2023    CO2 24 01/12/2023     Lab Results   Component Value Date    Q1XESCZ 1.23 06/02/2021    O9TAKHG 7.6 06/02/2021     Lab Results   Component Value Date    WBC 5.5 01/12/2023    HGB 16.2 01/12/2023    HCT 48.6 01/12/2023    MCV 88.5 01/12/2023     01/12/2023     No components found for: CHLPL  Lab Results   Component Value Date    TRIG 199 (H) 12/31/2022    TRIG 155 (H) 06/02/2021    TRIG 105 12/10/2020     Lab Results   Component Value Date    HDL 29 (L) 12/31/2022    HDL 35 (L) 06/02/2021    HDL 35 12/10/2020     Lab Results   Component Value Date    LDLCALC 66 12/31/2022    [employer] 121 (H) 06/02/2021    LDLCALC 80 12/10/2020     Lab Results   Component Value Date    LABVLDL 40 12/31/2022    LABVLDL 31 06/02/2021    LABVLDL 34 06/20/2018     LABS: 12/13/22:     trig 138 HDL 33 LDL 85 A1c 5.7%    Radiology Review:  Pertinent images / reports were reviewed as a part of this visit and reveals the following:    Cardiac Cath PCI: 12/20/20  EF 50 with mild anterior hypokinesis  100% RCA  70% prox LAD,90% mid LAD,60-70% distal LAD  Collaterals Lft to right     Anatomy:   LAD-prox 90%, mid 80%, distal 80%  RCA prox 100%  RPDA L to R collaterals to RPDA    Intervention  ~Successful PCI to LAD with 4.0x8, 3.5x38 and 3.0x15 GUALBERTO. PD with 3.75x12 NC to 20atm. Excellent Result. Stress 12/22  Summary    There is normal isotope uptake at stress and rest. There is no evidence of    myocardial ischemia or scar. Normal myocardial perfusion study. Normal LV size and systolic function. Abnormal ECG portion of stress test with 2 mm ST depression consistent with    ischemia. Overall findings represent a intermediate risk scan. Angiogram: 1/12/23:  Anatomy:   LM-nml   LAD-stents patent, distal 50%  Cx-mid 40%  OM1- prox 99%  OM2 prox 90% small vessel  RCA-ostial   RPDA- L to R collaterals  LVEF- 55  LVG- nml  LVEDP- 5     Intervention  ~Successful PCI to OM1 with 2.75x23 GUALBERTO. Excellent Result. Assessment:      Diagnosis Orders   1. Coronary artery disease involving native coronary artery of native heart without angina pectoris   ~improved s/p PTCA OM-1 ;  RCA with collateral ; non-obst disease of the distal LAD; OM-2 small branch  ~DAPT / statin / BB  ~resumed exercising with goals to do daily  ~hx PTCA LAD '20       2. Primary hypertension   ~controlled on metoprolol   ~off hyzaar       3. Mixed hyperlipidemia   ~HDL not at goal at 35  ~high intensity statin  ~PCSK-9 pending approval          Patient  is stable since hospital discharge.     Plan: Continue present management : declines cardiac rehab II   PCSK-9 has not yet been approved   F/U in 3-4 months with Dr. Jena Interiano    I have addressed the patient's cardiac risk factors and adjusted pharmacologic treatment as needed. In addition, I have reinforced the need for patient directed risk factor modification. Further evaluation will be based upon the patient's clinical course and testing results. All questions and concerns were addressed to the patient. Alternatives to  treatment were discussed. The patient  currently  is not smoking. The risks related to smoking were reviewed with the patient. Recommend maintaining a smoke-free lifestyle. Dual Antiplatelet therapy / anti-coagulation has been recommended / prescribed for this patient. Education conducted on adverse reactions including bleeding was discussed. The patient verbalizes understanding. Pt is on a BB  Pt is not on an ace-i/ARB : stopped d/t low BP; normal LVEF  Pt is on a statin      Saturated fat diet discussed  Exercise program discussed : goal to exercise daily : 30 min on treadmill at 3.0 , total gym 15-20 minutes    Thank you for allowing to us to participate in the care of Saida Jenkins.       Dean  Documentation of today's visit sent to PCP

## 2023-02-14 DIAGNOSIS — I25.10 CORONARY ARTERY DISEASE DUE TO LIPID RICH PLAQUE: Primary | ICD-10-CM

## 2023-02-14 DIAGNOSIS — I25.83 CORONARY ARTERY DISEASE DUE TO LIPID RICH PLAQUE: Primary | ICD-10-CM

## 2023-02-14 RX ORDER — EVOLOCUMAB 140 MG/ML
140 INJECTION, SOLUTION SUBCUTANEOUS
Qty: 2.1 ML | Refills: 5 | Status: SHIPPED | OUTPATIENT
Start: 2023-02-14

## 2023-02-15 ENCOUNTER — TELEPHONE (OUTPATIENT)
Dept: CARDIOLOGY CLINIC | Age: 53
End: 2023-02-15

## 2023-02-15 DIAGNOSIS — I25.83 CORONARY ARTERY DISEASE DUE TO LIPID RICH PLAQUE: ICD-10-CM

## 2023-02-15 DIAGNOSIS — I25.10 CORONARY ARTERY DISEASE DUE TO LIPID RICH PLAQUE: ICD-10-CM

## 2023-02-16 ENCOUNTER — TELEPHONE (OUTPATIENT)
Dept: CARDIOLOGY CLINIC | Age: 53
End: 2023-02-16

## 2023-03-06 RX ORDER — NITROGLYCERIN 0.4 MG/1
0.4 TABLET SUBLINGUAL EVERY 5 MIN PRN
Qty: 25 TABLET | Refills: 3 | Status: SHIPPED | OUTPATIENT
Start: 2023-03-06

## 2023-03-28 DIAGNOSIS — I25.10 CORONARY ARTERY DISEASE DUE TO LIPID RICH PLAQUE: ICD-10-CM

## 2023-03-28 DIAGNOSIS — I25.83 CORONARY ARTERY DISEASE DUE TO LIPID RICH PLAQUE: ICD-10-CM

## 2023-03-28 RX ORDER — EVOLOCUMAB 140 MG/ML
140 INJECTION, SOLUTION SUBCUTANEOUS
Qty: 2 EACH | Refills: 5 | Status: SHIPPED | OUTPATIENT
Start: 2023-03-28

## 2023-03-28 NOTE — TELEPHONE ENCOUNTER
Last OV: 1/30/23 npts  Last Labs: 12/31/22 lipid, 12/30/22 ast,alt  Last refill: 2/14/23  Next appt:  6/14/23 les

## 2023-05-01 DIAGNOSIS — E78.5 HYPERLIPIDEMIA, UNSPECIFIED HYPERLIPIDEMIA TYPE: ICD-10-CM

## 2023-05-01 RX ORDER — ATORVASTATIN CALCIUM 80 MG/1
TABLET, FILM COATED ORAL
Qty: 90 TABLET | Refills: 1 | Status: SHIPPED | OUTPATIENT
Start: 2023-05-01

## 2023-06-02 RX ORDER — CLOPIDOGREL BISULFATE 75 MG/1
TABLET ORAL
Qty: 90 TABLET | Refills: 1 | Status: SHIPPED | OUTPATIENT
Start: 2023-06-02

## 2023-06-02 NOTE — TELEPHONE ENCOUNTER
Future Appointments    Encounter Information    Provider Department Appt Notes   6/13/2023 Marianna Jules MD [facility] Internal Medicine 3 m     Past Visits    Date Provider Specialty Visit Type Primary Dx   01/24/2023 Marianna Jules MD Internal Medicine Office Visit Essential hypertension   05/05/2022 Marianna Jules MD Internal Medicine Office Visit Coronary artery disease of native artery of native heart with stable angina pectoris ([facility] [address]

## 2023-06-02 NOTE — TELEPHONE ENCOUNTER
Diabetic Education Consult Note    Patient: Brooklyn Mason female, Age:67 year old    Date: 6/1/2023 MRN: [id number]     Primary diagnoses:   Principal Problem:    PAD (peripheral artery disease) (CMD)      Secondary diagnoses:  Past Medical History:   Diagnosis Date   • Acute gout due to renal impairment involving toe of left foot 12/1/2020   • Acute gout of left foot 12/1/2020   • Acute UTI 5/15/2016   • APN (acute pyelonephritis) 8/30/2018   • Arm DVT (deep venous thromboembolism), acute (CMD) 5/25/2016   • Arm DVT (deep venous thromboembolism), acute, right (CMD) 1/26/2017   • Arthritis    • Atherosclerotic heart disease    • Dehydration 5/15/2016   • Diabetes (CMD)    • Hemarthrosis of left knee 5/22/2017   • HTN (hypertension)    • Hypercholesterolemia    • Stroke (CMD)     with cognitive aphasia   • Varicose veins of left lower extremity with pain 7/13/2020        Last A1C:  Hemoglobin A1C (%)   Date Value   04/28/2023 11.5 (H)         Vital Most Recent Value First Value   Weight 92.6 kg (204 lb 2.3 oz) Weight: 88.9 kg (196 lb)   Height 5' 8\" (172.7 cm) Height: 5' 8\" (172.7 cm)   BMI 31.04 N/A         Current Medications:      Scheduled Medications:  WARFARIN - PHARMACIST MONITORED, , See Admin Instructions  heparin (porcine), 5,000 Units, 3 times per day  insulin glargine, 15 Units, Nightly  allopurinol, 300 mg, Daily  amLODIPine, 5 mg, Daily  famotidine, 20 mg, Daily  ferrous sulfate, 325 mg, Daily with breakfast  levothyroxine, 75 mcg, QAM AC  lisinopril, 10 mg, Daily  metoPROLOL succinate, 50 mg, Q12H  oxybutynin, 5 mg, TID  atorvastatin, 10 mg, Nightly  doxazosin, 8 mg, Nightly  aspirin, 81 mg, Daily  insulin lispro, , TID WC         IV Meds:        PRN Medications:  acetaminophen, 650 mg, Q4H PRN  hydrOXYzine, 25 mg, Nightly PRN  docusate sodium-sennosides, 1 tablet, Q12H PRN  polyethylene glycol, 17 g, Daily PRN  nitroGLYCERIN, 0.4 mg, Q5 Min PRN  dextrose, 25 g, PRN  dextrose, 12.5 g, PRN  glucagon, 1  Received refill request for clopidogrel from [facility] pharmacy.     Last ov: 01/30/2023 NPTS    Last Refill: 11/24/2022 #90 w/ 1 refill    Next appointment: 06/14/2023 LES mg, PRN  dextrose, 15 g, PRN  dextrose, 30 g, PRN  sodium chloride, 500 mL, PRN  HYDROcodone-acetaminophen, 1 tablet, Q4H PRN  morphine injection, 2 mg, Q2H PRN  hydrALAZINE, 10 mg, Q6H PRN  cyclobenzaprine, 5 mg, TID PRN          Prior to Admission Medications:    Medications Prior to Admission   Medication Sig Dispense Refill   • clotrimazole (LOTRIMIN) 1 % cream Apply 1 Application topically 2 times daily as needed (Rash).     • diclofenac (Voltaren) 1 % gel Apply 2 g topically 4 times daily as needed (Pain).     • warfarin (COUMADIN) 2 MG tablet Take 2 tablets (4 mg) by mouth on 5/31/2023 or as directed by the [facility] anti-coag clinic.     • warfarin (COUMADIN) 3 MG tablet Take 1 tablet (3 mg) by mouth on Sunday, Thursday, Friday, and Saturday. Take 2 tablets (6 mg) on 5/30/2023  or as directed by the [facility] anti-coag clinic.     • enoxaparin (LOVENOX) 100 MG/ML injectable solution Inject 0.9 mLs into the skin every 12 hours. 15 each 1   • hydrOXYzine (ATARAX) 25 MG tablet TAKE 1 TO 2 TABLETS BY MOUTH AT BEDTIME FOR SLEEP/ANXIETY. MAX 2 TABS/24 HOURS. 90 tablet 1   • insulin glargine 100 UNIT/ML pen-injector Inject 38 Units into the skin nightly. Prime 2 units before each dose. 15 mL 3   • suvorexant 10 MG tablet Take 1 tablet by mouth nightly as needed (sleep). 30 tablet 0   • amLODIPine (NORVASC) 5 MG tablet Take 1 tablet by mouth daily. 90 tablet 1   • metoPROLOL succinate (TOPROL-XL) 50 MG 24 hr tablet TAKE 1 TABLET BY MOUTH TWICE A DAY IN THE MORNING AND IN THE EVENING 180 tablet 0   • ferrous gluconate (FERGON) 324 (38 Fe) MG tablet TAKE 1 TABLET BY MOUTH ONCE EVERY DAY. (TAKE WITHOUT FOOD OR DAIRY) 90 tablet 0   • simvastatin (ZOCOR) 20 MG tablet Take 1 tablet by mouth nightly. 90 tablet 1   • terazosin (HYTRIN) 10 MG capsule TAKE 1 CAPSULE BY MOUTH DAILY AT BEDTIME FOR HYPERTENSION 90 capsule 1   • nystatin (MYCOSTATIN) 758228 UNIT/GM powder APPLY TOPICALLY TO RASH OF GROIN OR UNDER BREAST FOLDS TWICE DAILY AS  NEEDED WHEN RASH PRESENT 30 g 3   • lisinopril (ZESTRIL) 10 MG tablet TAKE 1 TABLET BY MOUTH EVERY DAY 90 tablet 1   • allopurinol (ZYLOPRIM) 300 MG tablet TAKE 1 TABLET BY MOUTH ONCE DAILY FOR GOUT 90 tablet 1   • levothyroxine 75 MCG tablet TAKE 1 TABLET BY MOUTH ONCE DAILY FOR HYPERTHYROIDISM 90 tablet 1   • famotidine (PEPCID) 20 MG tablet TAKE 1 TABLET BY MOUTH TWICE DAILY FOR GERD. 180 tablet 3   • Cholecalciferol (Vitamin D3) 50 mcg (2,000 units) tablet TAKE 1 TABLET BY MOUTH DAILY AT BEDTIME FOR SUPPLEMENT. 84 tablet 3   • oxybutynin (DITROPAN) 5 MG tablet TAKE 1 TABLET BY MOUTH 3 TIMES DAILY FOR URINARY INCONTINENCE 270 tablet 3   • magnesium hydroxide (Milk of Magnesia) 400 MG/5ML suspension Take 15 mLs by mouth daily as needed for Constipation. 473 mL 3       Primary Care Physician:    The patient was last seen on 5-5-2023 by Sabina James APNP     The patient has a Glucometer:  Yes,  states True Metrix    Living Arrangements: Lives with      Meal Planning:  3 meals a day    Assessment  Patient seen in room 218 for diabetes education. Patient's  and granddaughter are present.  is patient's primary caregiver along with her daughter. Patient gives permission to talk about health related issues with family present. Patient is aphasic from a stroke with right side weakness. Her  performs all home glucose monitoring and administration of insulin. He reports testing blood sugars daily, sometimes twice daily. He tests fasting and thinks most of the readings are 150's or slightly higher. He uses insulin pen for delivery and confirms gives 38 units daily around 7:30 pm. He gives all injections on either side of the navel, has been giving insulin in this area for several years. Her last A1c was 11.5% on 4- which was addressed at office visit with PCP. At that appointment it was recommended they attend outpatient diabetes education but have not scheduled an appointment.  They are encouraged to contact the office to schedule the appointment to assist in learning more about diabetes.    Education  Education provided to patient and  about injection sites and need for rotation and using different areas. Discussed how lipohypertrophy can develop when using same sites for repeated injections. Educated on alternate sites such as back of arms and upper outer thigh area. Informed on what is ordered for insulin therapy during hospitalization and glucose response. We reviewed how the A1c test is used in monitoring diabetes and how it correlates with A1c test. Suggested they test blood sugars at least twice daily, fasting and pre dinner until seen by outpatient diabetes education, bring documented readings and log book to appointments. Suggested they aim for glucose goals of 130-180. Reminded not to eat or drink anything with calories for 2 hours prior to pre dinner test. Discussed hypoglycemia as potential side effect of insulin, especially with recent dose increase from PCP office and if using fresh injection sites. Discussed the effect of hypoglycemia on the body, symptoms and treatment. Offered a log book to document blood sugars but declined as they will use a notebook.    Plan  Strongly encouraged scheduling with outpatient diabetes education.  Outpatient Diabetes Education Referral:  PCP has placed referral, patient's family encouraged to schedule appointment.    Rena Feng RN CDE  6/1/2023  12:00 PM

## 2023-09-15 DIAGNOSIS — I25.10 CORONARY ARTERY DISEASE DUE TO LIPID RICH PLAQUE: ICD-10-CM

## 2023-09-15 DIAGNOSIS — I25.83 CORONARY ARTERY DISEASE DUE TO LIPID RICH PLAQUE: ICD-10-CM

## 2023-09-15 RX ORDER — EVOLOCUMAB 140 MG/ML
140 INJECTION, SOLUTION SUBCUTANEOUS
Qty: 6 EACH | Refills: 5 | Status: SHIPPED | OUTPATIENT
Start: 2023-09-15

## 2023-09-15 NOTE — TELEPHONE ENCOUNTER
Received refill request   for repatha from [facility] pharmacy.     Last ov: 06/14/2023 LES    Last Refill: 03/28/2023    Next appointment: 12/11/2023 LES   (RECALL LIST)

## 2023-10-13 DIAGNOSIS — E78.5 HYPERLIPIDEMIA, UNSPECIFIED HYPERLIPIDEMIA TYPE: ICD-10-CM

## 2023-10-16 RX ORDER — ATORVASTATIN CALCIUM 80 MG/1
TABLET, FILM COATED ORAL
Qty: 90 TABLET | Refills: 0 | Status: SHIPPED | OUTPATIENT
Start: 2023-10-16 | End: 2023-12-12 | Stop reason: DRUGHIGH

## 2023-11-27 RX ORDER — CLOPIDOGREL BISULFATE 75 MG/1
TABLET ORAL
Qty: 90 TABLET | Refills: 1 | Status: SHIPPED | OUTPATIENT
Start: 2023-11-27

## 2023-11-27 NOTE — TELEPHONE ENCOUNTER
Received refill request for clopidogrel from [facility] pharmacy.     Last ov: 06/14/2023 LES    Last Refill: 06/02/2023 #90 w/ 1 refill    Next appointment: 12/11/2023 LES   (RECALL LIST)

## 2023-12-12 ENCOUNTER — OFFICE VISIT (OUTPATIENT)
Dept: INTERNAL MEDICINE CLINIC | Age: 53
End: 2023-12-12
Payer: COMMERCIAL

## 2023-12-12 VITALS
DIASTOLIC BLOOD PRESSURE: 84 MMHG | SYSTOLIC BLOOD PRESSURE: 122 MMHG | BODY MASS INDEX: 35.29 KG/M2 | HEART RATE: 71 BPM | WEIGHT: 260.2 LBS | OXYGEN SATURATION: 96 %

## 2023-12-12 DIAGNOSIS — E78.5 HYPERLIPIDEMIA, UNSPECIFIED HYPERLIPIDEMIA TYPE: Primary | ICD-10-CM

## 2023-12-12 DIAGNOSIS — R73.03 PREDIABETES: ICD-10-CM

## 2023-12-12 DIAGNOSIS — I25.118 CORONARY ARTERY DISEASE OF NATIVE ARTERY OF NATIVE HEART WITH STABLE ANGINA PECTORIS (HCC): ICD-10-CM

## 2023-12-12 DIAGNOSIS — I10 ESSENTIAL HYPERTENSION: ICD-10-CM

## 2023-12-12 PROCEDURE — 99213 OFFICE O/P EST LOW 20 MIN: CPT | Performed by: INTERNAL MEDICINE

## 2023-12-12 PROCEDURE — 3074F SYST BP LT 130 MM HG: CPT | Performed by: INTERNAL MEDICINE

## 2023-12-12 PROCEDURE — 3079F DIAST BP 80-89 MM HG: CPT | Performed by: INTERNAL MEDICINE

## 2023-12-12 RX ORDER — ATORVASTATIN CALCIUM 80 MG/1
40 TABLET, FILM COATED ORAL DAILY
Qty: 30 TABLET | Refills: 0 | Status: SHIPPED
Start: 2023-12-12

## 2023-12-12 ASSESSMENT — ENCOUNTER SYMPTOMS
WHEEZING: 0
ABDOMINAL PAIN: 0
NAUSEA: 0
SHORTNESS OF BREATH: 0

## 2023-12-12 NOTE — PROGRESS NOTES
unspecified hyperlipidemia type    Patient is currently taking-     atorvastatin (LIPITOR) 80 MG tablet; Take 0.5 tablets by mouth daily TAKE 1 TABLET BY MOUTH EVERY DAY  He is also taking biweekly Repatha  Essential hypertension    Prediabetes    Coronary artery disease of native artery of native heart with stable angina pectoris (HCC)  Continue diet lifestyle changes, atorvastatin aspirin Plavix, metoprolol. Last hemoglobin A1c was 5.6        No orders of the defined types were placed in this encounter. Current Outpatient Medications   Medication Sig Dispense Refill    atorvastatin (LIPITOR) 80 MG tablet Take 0.5 tablets by mouth daily TAKE 1 TABLET BY MOUTH EVERY DAY 30 tablet 0    clopidogrel (PLAVIX) 75 MG tablet TAKE 1 TABLET BY MOUTH EVERY DAY 90 tablet 1    metoprolol tartrate (LOPRESSOR) 25 MG tablet TAKE 1 TABLET BY MOUTH TWICE A  tablet 1    REPATHA SOSY syringe INJECT 1 ML INTO THE SKIN EVERY 14 DAYS 6 each 5    aspirin EC 81 MG EC tablet Take 1 tablet by mouth daily 90 tablet 1     No current facility-administered medications for this visit. Return in about 6 months (around 6/12/2024). An After Visit Summary was printed and given to the patient. 'Documentation was done using voice recognition dragon software. Every effort was made to ensure accuracy; however, inadvertent  Unintentional computerized transcription errors may be present.

## 2023-12-13 LAB
CHOLESTEROL, TOTAL: 75 MG/DL
CHOLESTEROL/HDL RATIO: 2.3
HDLC SERPL-MCNC: 33 MG/DL (ref 35–70)
LDL CHOLESTEROL: 20
NONHDLC SERPL-MCNC: ABNORMAL MG/DL
TRIGL SERPL-MCNC: 121 MG/DL
VLDLC SERPL CALC-MCNC: 22 MG/DL

## 2024-01-10 ENCOUNTER — HOSPITAL ENCOUNTER (OUTPATIENT)
Dept: NON INVASIVE DIAGNOSTICS | Age: 54
Discharge: HOME | End: 2024-01-10

## 2024-01-10 PROCEDURE — 93017 CV STRESS TEST TRACING ONLY: CPT | Performed by: INTERNAL MEDICINE

## 2024-01-22 ENCOUNTER — OFFICE VISIT (OUTPATIENT)
Dept: CARDIOLOGY CLINIC | Age: 54
End: 2024-01-22
Payer: COMMERCIAL

## 2024-01-22 VITALS
HEART RATE: 61 BPM | SYSTOLIC BLOOD PRESSURE: 126 MMHG | DIASTOLIC BLOOD PRESSURE: 82 MMHG | OXYGEN SATURATION: 98 % | WEIGHT: 260.7 LBS | HEIGHT: 72 IN | BODY MASS INDEX: 35.31 KG/M2

## 2024-01-22 DIAGNOSIS — I25.83 CORONARY ARTERY DISEASE DUE TO LIPID RICH PLAQUE: Primary | ICD-10-CM

## 2024-01-22 DIAGNOSIS — R73.03 PREDIABETES: ICD-10-CM

## 2024-01-22 DIAGNOSIS — I10 ESSENTIAL HYPERTENSION: ICD-10-CM

## 2024-01-22 DIAGNOSIS — I25.10 CORONARY ARTERY DISEASE DUE TO LIPID RICH PLAQUE: Primary | ICD-10-CM

## 2024-01-22 DIAGNOSIS — E78.2 MIXED HYPERLIPIDEMIA: ICD-10-CM

## 2024-01-22 PROCEDURE — 99214 OFFICE O/P EST MOD 30 MIN: CPT | Performed by: INTERNAL MEDICINE

## 2024-01-22 PROCEDURE — 3079F DIAST BP 80-89 MM HG: CPT | Performed by: INTERNAL MEDICINE

## 2024-01-22 PROCEDURE — 3074F SYST BP LT 130 MM HG: CPT | Performed by: INTERNAL MEDICINE

## 2024-01-22 NOTE — PATIENT INSTRUCTIONS
Try to limit sugar and carbohydrates  No medication changes  Continue risk factor modifications.   Call for any change in symptoms, call to report any changes in shortness of breath or development of chest pain with activity.    Follow up in 6 mos

## 2024-02-05 ENCOUNTER — TELEPHONE (OUTPATIENT)
Dept: ADMINISTRATIVE | Age: 54
End: 2024-02-05

## 2024-02-05 NOTE — TELEPHONE ENCOUNTER
Submitted PA for Repatha  [facility]: [id number] STATUS: PENDING.    Follow up done daily; if no decision with in three days we will refax.  If another three days goes by with no decision will call the insurance for status.

## 2024-02-06 NOTE — TELEPHONE ENCOUNTER
Message from Plan  CaseId:[id number];Status:Approved;  Review Type:Prior Auth;Coverage   Start Date:02/05/2024;  Coverage End Date:02/06/2025

## 2024-05-28 RX ORDER — CLOPIDOGREL BISULFATE 75 MG/1
TABLET ORAL
Qty: 90 TABLET | Refills: 1 | Status: SHIPPED | OUTPATIENT
Start: 2024-05-28

## 2024-05-28 NOTE — TELEPHONE ENCOUNTER
Received refill request for clopidogrel from [facility] pharmacy.    Last ov: 01//22/2024 LES    Last Refill: 11/27/2023 #90 w/ 1 refill    Next appointment: 07/10/2024 LES

## 2024-06-24 NOTE — PROGRESS NOTES
20atm.   Excellent Result.        Angiogram: 1/12/23:  Anatomy:   LM-nml   LAD-stents patent, distal 50%  Cx-mid 40%  OM1- prox 99%  OM2 prox 90% small vessel  RCA-ostial   RPDA- L to R collaterals  LVEF- 55  LVG- nml  LVEDP- 5     Intervention  ~Successful PCI to OM1 with 2.75x23 GUALBERTO. Excellent Result.     1/10/24 stress test  Summary Good exercise tolerance with no chest pain. Borderline normal EKG response with slightly less than 1 mm ST depression in early recovery. Occasional premature atrial and ventricular contractions.      Assessment:    1. CAD -   denies anginal symptoms  [facility] 1/12/23> Successful PCI to OM1 with 2.75x23 GUALBERTO.   RCA with collateral ; non-obst disease of the distal LAD; OM-2 small branch    Stress test 12/2022   Normal myocardial perfusion study.    Normal LV size and systolic function.    Abnormal ECG portion of stress test with 2 mm ST depression consistent with    ischemia.    Overall findings represent a intermediate risk scan.     [facility]  12/3/20> PCI to LAD with 4.0x8, 3.5x38 and 3.0x15 GUALBERTO. PD with 3.75x12 NC to 20atm. Excellent Result. Residual RCA   CT Calcium score 6/3/20> TOTAL AGATSTON CALCIUM SCORE: 938  Stress 6/30/20>  Normal myocardial perfusion. Normal LV size and systolic function.  Abnormal EKG response with 1 mm ST depression.Numerous family members with recently diagnosed CAD requiring bypass or stents    1/10/24 stress test scheduled > stable    Plan for stress test nearing the end of the year (works at [employer])  Remain on ASA, statin, Plavix, BB, Repatha     2. Hyperlipidemia-  Continue yearly lab work through [employer]   12/31/22   HDL 29 LDL 66, ALT 42, AST 31  lipids 6/14/23 TC 61 TG 68  HDL 39 LDL 8  Lipids 12/13/23 TC 75   HDL 33 LDL 20  Remain on Repatha and Lipitor 40 mg      3. Essential hypertension   -Blood pressure 118/78, pulse 65, height 1.829 m (6'), weight 118.4 kg (261 lb), SpO2 95 %.  Controlled > continue

## 2024-07-10 ENCOUNTER — OFFICE VISIT (OUTPATIENT)
Dept: CARDIOLOGY CLINIC | Age: 54
End: 2024-07-10
Payer: COMMERCIAL

## 2024-07-10 VITALS
OXYGEN SATURATION: 95 % | SYSTOLIC BLOOD PRESSURE: 118 MMHG | HEART RATE: 65 BPM | BODY MASS INDEX: 35.35 KG/M2 | HEIGHT: 72 IN | DIASTOLIC BLOOD PRESSURE: 78 MMHG | WEIGHT: 261 LBS

## 2024-07-10 DIAGNOSIS — I10 ESSENTIAL HYPERTENSION: ICD-10-CM

## 2024-07-10 DIAGNOSIS — I25.83 CORONARY ARTERY DISEASE DUE TO LIPID RICH PLAQUE: Primary | ICD-10-CM

## 2024-07-10 DIAGNOSIS — E78.2 MIXED HYPERLIPIDEMIA: ICD-10-CM

## 2024-07-10 DIAGNOSIS — E78.5 HYPERLIPIDEMIA, UNSPECIFIED HYPERLIPIDEMIA TYPE: ICD-10-CM

## 2024-07-10 DIAGNOSIS — I25.10 CORONARY ARTERY DISEASE DUE TO LIPID RICH PLAQUE: Primary | ICD-10-CM

## 2024-07-10 DIAGNOSIS — R73.03 PREDIABETES: ICD-10-CM

## 2024-07-10 PROCEDURE — 3074F SYST BP LT 130 MM HG: CPT | Performed by: INTERNAL MEDICINE

## 2024-07-10 PROCEDURE — 3078F DIAST BP <80 MM HG: CPT | Performed by: INTERNAL MEDICINE

## 2024-07-10 PROCEDURE — 99214 OFFICE O/P EST MOD 30 MIN: CPT | Performed by: INTERNAL MEDICINE

## 2024-07-10 RX ORDER — ATORVASTATIN CALCIUM 40 MG/1
40 TABLET, FILM COATED ORAL DAILY
Qty: 90 TABLET | Refills: 3
Start: 2024-07-10

## 2024-07-10 NOTE — PATIENT INSTRUCTIONS
Ok to continue 40 mg Lipitor daily  Recommend company stress test yearly  Exercise encouraged  Continue risk factor modifications.   Call for any change in symptoms, call to report any changes in shortness of breath or development of chest pain with activity.    Follow up in 6 mos

## 2024-07-15 SDOH — ECONOMIC STABILITY: HOUSING INSECURITY
IN THE LAST 12 MONTHS, WAS THERE A TIME WHEN YOU DID NOT HAVE A STEADY PLACE TO SLEEP OR SLEPT IN A SHELTER (INCLUDING NOW)?: NO

## 2024-07-15 SDOH — ECONOMIC STABILITY: FOOD INSECURITY: WITHIN THE PAST 12 MONTHS, YOU WORRIED THAT YOUR FOOD WOULD RUN OUT BEFORE YOU GOT MONEY TO BUY MORE.: NEVER TRUE

## 2024-07-15 SDOH — ECONOMIC STABILITY: INCOME INSECURITY: HOW HARD IS IT FOR YOU TO PAY FOR THE VERY BASICS LIKE FOOD, HOUSING, MEDICAL CARE, AND HEATING?: NOT HARD AT ALL

## 2024-07-15 SDOH — ECONOMIC STABILITY: FOOD INSECURITY: WITHIN THE PAST 12 MONTHS, THE FOOD YOU BOUGHT JUST DIDN'T LAST AND YOU DIDN'T HAVE MONEY TO GET MORE.: NEVER TRUE

## 2024-07-15 ASSESSMENT — PATIENT HEALTH QUESTIONNAIRE - PHQ9
SUM OF ALL RESPONSES TO PHQ9 QUESTIONS 1 & 2: 0
1. LITTLE INTEREST OR PLEASURE IN DOING THINGS: NOT AT ALL
2. FEELING DOWN, DEPRESSED OR HOPELESS: NOT AT ALL
SUM OF ALL RESPONSES TO PHQ QUESTIONS 1-9: 0
SUM OF ALL RESPONSES TO PHQ9 QUESTIONS 1 & 2: 0
2. FEELING DOWN, DEPRESSED OR HOPELESS: NOT AT ALL
SUM OF ALL RESPONSES TO PHQ QUESTIONS 1-9: 0
SUM OF ALL RESPONSES TO PHQ QUESTIONS 1-9: 0
1. LITTLE INTEREST OR PLEASURE IN DOING THINGS: NOT AT ALL
SUM OF ALL RESPONSES TO PHQ QUESTIONS 1-9: 0

## 2024-07-17 ENCOUNTER — OFFICE VISIT (OUTPATIENT)
Dept: INTERNAL MEDICINE CLINIC | Age: 54
End: 2024-07-17
Payer: COMMERCIAL

## 2024-07-17 VITALS
SYSTOLIC BLOOD PRESSURE: 132 MMHG | DIASTOLIC BLOOD PRESSURE: 84 MMHG | HEIGHT: 72 IN | HEART RATE: 64 BPM | BODY MASS INDEX: 34.62 KG/M2 | WEIGHT: 255.6 LBS

## 2024-07-17 DIAGNOSIS — I10 ESSENTIAL HYPERTENSION: Primary | ICD-10-CM

## 2024-07-17 DIAGNOSIS — I25.118 CORONARY ARTERY DISEASE OF NATIVE ARTERY OF NATIVE HEART WITH STABLE ANGINA PECTORIS (HCC): ICD-10-CM

## 2024-07-17 DIAGNOSIS — R79.89 ELEVATED SERUM CREATININE: ICD-10-CM

## 2024-07-17 DIAGNOSIS — R73.03 PREDIABETES: ICD-10-CM

## 2024-07-17 DIAGNOSIS — E78.5 HYPERLIPIDEMIA, UNSPECIFIED HYPERLIPIDEMIA TYPE: ICD-10-CM

## 2024-07-17 DIAGNOSIS — I10 ESSENTIAL HYPERTENSION: ICD-10-CM

## 2024-07-17 LAB
ANION GAP SERPL CALCULATED.3IONS-SCNC: 12 MMOL/L (ref 3–16)
BUN SERPL-MCNC: 19 MG/DL (ref 7–20)
CALCIUM SERPL-MCNC: 9.6 MG/DL (ref 8.3–10.6)
CHLORIDE SERPL-SCNC: 103 MMOL/L (ref 99–110)
CO2 SERPL-SCNC: 25 MMOL/L (ref 21–32)
CREAT SERPL-MCNC: 1.3 MG/DL (ref 0.9–1.3)
EST. AVERAGE GLUCOSE BLD GHB EST-MCNC: 114 MG/DL
GFR SERPLBLD CREATININE-BSD FMLA CKD-EPI: 64 ML/MIN/{1.73_M2}
GLUCOSE SERPL-MCNC: 115 MG/DL (ref 70–99)
HBA1C MFR BLD: 5.6 %
POTASSIUM SERPL-SCNC: 4.6 MMOL/L (ref 3.5–5.1)
SODIUM SERPL-SCNC: 140 MMOL/L (ref 136–145)

## 2024-07-17 PROCEDURE — 3079F DIAST BP 80-89 MM HG: CPT | Performed by: INTERNAL MEDICINE

## 2024-07-17 PROCEDURE — 99214 OFFICE O/P EST MOD 30 MIN: CPT | Performed by: INTERNAL MEDICINE

## 2024-07-17 PROCEDURE — 3075F SYST BP GE 130 - 139MM HG: CPT | Performed by: INTERNAL MEDICINE

## 2024-07-17 ASSESSMENT — ENCOUNTER SYMPTOMS
PHOTOPHOBIA: 0
ABDOMINAL PAIN: 0
CHEST TIGHTNESS: 0
WHEEZING: 0
TROUBLE SWALLOWING: 0
NAUSEA: 0
SHORTNESS OF BREATH: 0
VOICE CHANGE: 0

## 2024-07-17 NOTE — PROGRESS NOTES
Marques Peng  1970  male  54 y.o.    SUBJECTIVE:       Chief Complaint   Patient presents with    Hypertension       HPI:  Hypertension/coronary artery disease:    Marques Peng returns for follow up of hypertension.  Tolerating medications well and taking them as directed. No symptoms (denies chest pain,dyspnea,edema or TIA's or blurred vision) concerning for end organ damage are present.  Hyperlipidemia:    Patient returns for follow up of hyperlipidemia.  Patient has been taking His medications as prescribed.  Patient's lipids are controlled. Denies myalgias or any GI upset. Side effects related to taking the medications include none.      Lab Results   Component Value Date/Time    TRIG 199 12/31/2022 05:22 AM    HDL 39 06/14/2023 03:16 PM    HDL 39 03/12/2010 11:36 AM     Lab Results   Component Value Date    ALT 24 06/14/2023    AST 19 06/14/2023                Past Medical History:   Diagnosis Date    Coronary artery disease involving native coronary artery of native heart with angina pectoris (HCC)     ~Successful PCI to LAD with 4.0x8, 3.5x38 and 3.0x15 GUALBERTO. PD with 3.75x12 NC to 20atm. Excellent Result    Hyperlipidemia     Hypertension      Past Surgical History:   Procedure Laterality Date    CARDIAC CATHETERIZATION      CORONARY ANGIOPLASTY WITH STENT PLACEMENT  2020    Dr.. Warner and 1/2023    TONSILLECTOMY AND ADENOIDECTOMY  1974     Social History     Socioeconomic History    Marital status:      Spouse name: None    Number of children: 3    Years of education: None    Highest education level: None   Occupational History    Occupation:      Comment: [employer]    Occupation: Dept Mgr   Tobacco Use    Smoking status: Never     Passive exposure: Never    Smokeless tobacco: Never   Vaping Use    Vaping Use: Never used   Substance and Sexual Activity    Alcohol use: Not Currently     Comment: socially, 1 drink of beer monthly    Drug use: No    Sexual activity: Yes

## 2024-08-21 ENCOUNTER — OFFICE VISIT (OUTPATIENT)
Dept: INTERNAL MEDICINE CLINIC | Age: 54
End: 2024-08-21
Payer: COMMERCIAL

## 2024-08-21 VITALS
WEIGHT: 249.8 LBS | TEMPERATURE: 97.3 F | DIASTOLIC BLOOD PRESSURE: 84 MMHG | SYSTOLIC BLOOD PRESSURE: 130 MMHG | HEART RATE: 68 BPM | BODY MASS INDEX: 33.88 KG/M2 | OXYGEN SATURATION: 97 %

## 2024-08-21 DIAGNOSIS — S39.012A BACK STRAIN, INITIAL ENCOUNTER: Primary | ICD-10-CM

## 2024-08-21 PROCEDURE — 3075F SYST BP GE 130 - 139MM HG: CPT | Performed by: INTERNAL MEDICINE

## 2024-08-21 PROCEDURE — 3079F DIAST BP 80-89 MM HG: CPT | Performed by: INTERNAL MEDICINE

## 2024-08-21 PROCEDURE — 99213 OFFICE O/P EST LOW 20 MIN: CPT | Performed by: INTERNAL MEDICINE

## 2024-08-21 RX ORDER — TIZANIDINE 2 MG/1
2 TABLET ORAL EVERY 8 HOURS PRN
Qty: 30 TABLET | Refills: 0 | Status: SHIPPED | OUTPATIENT
Start: 2024-08-21

## 2024-08-21 RX ORDER — PREDNISONE 50 MG/1
50 TABLET ORAL 2 TIMES DAILY
Qty: 5 TABLET | Refills: 0 | Status: SHIPPED | OUTPATIENT
Start: 2024-08-21 | End: 2024-08-22 | Stop reason: SDUPTHER

## 2024-08-21 ASSESSMENT — ENCOUNTER SYMPTOMS
VOMITING: 0
NAUSEA: 0
ABDOMINAL PAIN: 0
CHANGE IN BOWEL HABIT: 0

## 2024-08-21 NOTE — PROGRESS NOTES
URINALYSIS:  Lab Results   Component Value Date/Time    GLUCOSEU Negative 09/19/2017 10:47 AM    GLUCOSEU NEGATIVE 03/12/2010 11:36 AM    KETUA Negative 09/19/2017 10:47 AM    BLOODU Negative 09/19/2017 10:47 AM    PHUR 6.0 09/19/2017 10:47 AM    PROTEINU Negative 09/19/2017 10:47 AM    NITRU Negative 09/19/2017 10:47 AM    LEUKOCYTESUR Negative 09/19/2017 10:47 AM    URINETYPE Not Specified 09/19/2017 10:47 AM     HBA1C:   Lab Results   Component Value Date/Time    LABA1C 5.6 07/17/2024 08:40 AM    .0 07/17/2024 08:40 AM     MICRO/ALB: No results found for: \"LABCREA\", \"MALBCR\"  LIPID:  Lab Results   Component Value Date/Time    CHOL 75 12/13/2023 12:00 AM    TRIG 121 12/13/2023 12:00 AM    HDL 33 12/13/2023 12:00 AM    HDL 39 03/12/2010 11:36 AM    LDL 20 12/13/2023 12:00 AM    LDL 8 06/14/2023 03:16 PM     TSH:   Lab Results   Component Value Date/Time    TSH 1.85 01/25/2017 09:32 AM     PSA:   Lab Results   Component Value Date/Time    PSA 0.79 01/24/2023 09:51 AM        ASSESSMENT/PLAN:  Assessment/Plan:  Marques was seen today for pain.    Diagnoses and all orders for this visit:    Back strain, initial encounter  -     predniSONE (DELTASONE) 50 MG tablet; Take 1 tablet by mouth 2 times daily for 5 days  -     tiZANidine (ZANAFLEX) 2 MG tablet; Take 1 tablet by mouth every 8 hours as needed (back pain)  Discussed use, benefit, and side effects of prescribed medications.  Pt voiced understanding. Advise to call me if there is any concerning symptoms.    Patient is advised to make appointment in 4 weeks or sooner if any worsening or new or concerning symptoms        No orders of the defined types were placed in this encounter.    Current Outpatient Medications   Medication Sig Dispense Refill    predniSONE (DELTASONE) 50 MG tablet Take 1 tablet by mouth 2 times daily for 5 days 5 tablet 0    tiZANidine (ZANAFLEX) 2 MG tablet Take 1 tablet by mouth every 8 hours as needed (back pain) 30 tablet 0

## 2024-08-22 DIAGNOSIS — S39.012A BACK STRAIN, INITIAL ENCOUNTER: ICD-10-CM

## 2024-08-22 RX ORDER — PREDNISONE 50 MG/1
50 TABLET ORAL 2 TIMES DAILY
Qty: 10 TABLET | Refills: 0 | Status: SHIPPED | OUTPATIENT
Start: 2024-08-22 | End: 2024-08-22 | Stop reason: DRUGHIGH

## 2024-08-22 RX ORDER — PREDNISONE 50 MG/1
50 TABLET ORAL DAILY
Qty: 5 TABLET | Refills: 0 | Status: SHIPPED | OUTPATIENT
Start: 2024-08-22 | End: 2024-08-27

## 2024-08-22 RX ORDER — PREDNISONE 50 MG/1
50 TABLET ORAL DAILY
Qty: 5 TABLET | Refills: 0 | Status: SHIPPED | OUTPATIENT
Start: 2024-08-22 | End: 2024-08-22 | Stop reason: SDUPTHER

## 2024-08-22 NOTE — TELEPHONE ENCOUNTER
Patient states he was seen yesterday (8/21) and was prescribed   Prednisone. Pharmacy stated that they are waiting on more information from the doctors office in order for them to fill. Patient is unsure what is needed for the medication to be filled. Please call pharmacy and verify, thanks (:     Medication:     predniSONE (DELTASONE) 50 MG table

## 2024-08-22 NOTE — TELEPHONE ENCOUNTER
Spoke with Kirstie at [facility] pharmacy. Script was written to take 2 times for 5 days but only 5 pills were sent. New Script pended.

## 2024-10-08 ENCOUNTER — TELEPHONE (OUTPATIENT)
Dept: CARDIOLOGY CLINIC | Age: 54
End: 2024-10-08

## 2024-11-10 DIAGNOSIS — I25.83 CORONARY ARTERY DISEASE DUE TO LIPID RICH PLAQUE: ICD-10-CM

## 2024-11-10 DIAGNOSIS — I25.10 CORONARY ARTERY DISEASE DUE TO LIPID RICH PLAQUE: ICD-10-CM

## 2024-11-11 NOTE — TELEPHONE ENCOUNTER
Last ov:7/10/24 LES  Next ov:24 LES  Last EK23  Last labs:24  Last filled:   Disp Refills Start End    REPATHA SOSY syringe 6 each 5 9/15/2023 --    Sig - Route: INJECT 1 ML INTO THE SKIN EVERY 14 DAYS - SubCUTAneous    Sent to pharmacy as: Repatha 140 MG/ML Subcutaneous Solution Prefilled Syringe (Evolocumab)    E-Prescribing Status: Receipt confirmed by pharmacy (9/15/2023  1:23 PM EDT)

## 2024-11-19 RX ORDER — EVOLOCUMAB 140 MG/ML
140 INJECTION, SOLUTION SUBCUTANEOUS
Qty: 6 EACH | Refills: 5 | Status: SHIPPED | OUTPATIENT
Start: 2024-11-19

## 2024-11-25 RX ORDER — CLOPIDOGREL BISULFATE 75 MG/1
TABLET ORAL
Qty: 90 TABLET | Refills: 3 | Status: SHIPPED | OUTPATIENT
Start: 2024-11-25

## 2024-11-25 RX ORDER — METOPROLOL TARTRATE 25 MG/1
TABLET, FILM COATED ORAL
Qty: 180 TABLET | Refills: 1 | Status: SHIPPED | OUTPATIENT
Start: 2024-11-25

## 2024-11-25 NOTE — TELEPHONE ENCOUNTER
Received refill request for clopidogrel from [facility] pharmacy.    Last ov: 07/10/2024 LES    Last Refill: 05/28/2024 #90 w/ 1    Next appointment: 03/19/2025 LES

## 2024-12-12 LAB
ALBUMIN: 4.4 G/DL
ALP BLD-CCNC: 64 U/L
ALT SERPL-CCNC: 28 U/L
ANION GAP SERPL CALCULATED.3IONS-SCNC: ABNORMAL MMOL/L
AST SERPL-CCNC: 21 U/L
BILIRUB SERPL-MCNC: 1.3 MG/DL (ref 0.1–1.4)
BUN BLDV-MCNC: 20 MG/DL
CALCIUM SERPL-MCNC: 9.7 MG/DL
CHLORIDE BLD-SCNC: 101 MMOL/L
CHOLESTEROL, TOTAL: 79 MG/DL
CHOLESTEROL/HDL RATIO: 2.5
CO2: ABNORMAL
CREAT SERPL-MCNC: 1.45 MG/DL
ESTIMATED AVERAGE GLUCOSE: NORMAL
GFR, ESTIMATED: 57
GFR, ESTIMATED: NORMAL
GLUCOSE BLD-MCNC: ABNORMAL MG/DL
HBA1C MFR BLD: 6 %
HDLC SERPL-MCNC: 31 MG/DL (ref 35–70)
LDL CHOLESTEROL: 26
NONHDLC SERPL-MCNC: ABNORMAL MG/DL
POTASSIUM SERPL-SCNC: 4.9 MMOL/L
SODIUM BLD-SCNC: 139 MMOL/L
TOTAL PROTEIN: 6.8 G/DL (ref 6.4–8.2)
TRIGL SERPL-MCNC: 125 MG/DL
VLDLC SERPL CALC-MCNC: 22 MG/DL

## 2025-02-10 DIAGNOSIS — I25.83 CORONARY ARTERY DISEASE DUE TO LIPID RICH PLAQUE: ICD-10-CM

## 2025-02-10 DIAGNOSIS — I25.10 CORONARY ARTERY DISEASE DUE TO LIPID RICH PLAQUE: ICD-10-CM

## 2025-02-10 RX ORDER — EVOLOCUMAB 140 MG/ML
140 INJECTION, SOLUTION SUBCUTANEOUS
Qty: 6 ADJUSTABLE DOSE PRE-FILLED PEN SYRINGE | Refills: 3 | Status: SHIPPED | OUTPATIENT
Start: 2025-02-10

## 2025-03-03 NOTE — PROGRESS NOTES
work through [employer]   12/31/22   HDL 29 LDL 66, ALT 42, AST 31  lipids 6/14/23 TC 61 TG 68  HDL 39 LDL 8  Lipids 12/13/23 TC 75   HDL 33 LDL 20 (media tab)  12/12/24 TC 79    HDL 31  LDL 26   Continue on Repatha and Lipitor 40 mg      3. Essential hypertension   -Blood pressure 118/80, pulse 54, temperature 97.2 °F (36.2 °C), temperature source Temporal, height 1.829 m (6'), weight 117.2 kg (258 lb 4.8 oz), SpO2 96%.  Controlled > remain on Lopressor 25 mg twice daily     4. Prediabetes - mild insulin resistance  Try to limit sugar and carbohydrates  Hgb A1c 6.0 12/13/23  Hgb A1c 5.6 7/17/24  Hgb A1c 6.0 12/12/24           Plan:      3/19/25  Cardiac test and lab results personally reviewed by me during this office visit and discussed.     Stress test and carotid vascular test scheduled in June through [employer]   If changes noted on stress test, consider [facility]  No medication changes   Continue risk factor modifications.   Call for any change in symptoms, call to report any changes in shortness of breath or development of chest pain with activity.    Follow up in 6 mos, before this visit check cmp, cbc, lipids (labs) > in 2026, FU Dr Regan               I appreciate the opportunity of cooperating in the care of this individual.    Angel Warner M.D., [facility]    Patient's problem list, medications, allergies, past medical, surgical, social and family histories were reviewed and updated as appropriate.    Scribe's attestation: This note was scribed in the presence of Dr Warner by Sumanth Hoskins, DAVID.    The scribe's documentation has been prepared under my direction and personally reviewed by me in its entirety. I confirm that the note above accurately reflects all work, treatment, procedures, and medical decision making performed by me.

## 2025-03-10 SDOH — ECONOMIC STABILITY: FOOD INSECURITY: WITHIN THE PAST 12 MONTHS, YOU WORRIED THAT YOUR FOOD WOULD RUN OUT BEFORE YOU GOT MONEY TO BUY MORE.: PATIENT DECLINED

## 2025-03-10 SDOH — ECONOMIC STABILITY: TRANSPORTATION INSECURITY
IN THE PAST 12 MONTHS, HAS THE LACK OF TRANSPORTATION KEPT YOU FROM MEDICAL APPOINTMENTS OR FROM GETTING MEDICATIONS?: PATIENT DECLINED

## 2025-03-10 SDOH — ECONOMIC STABILITY: FOOD INSECURITY: WITHIN THE PAST 12 MONTHS, THE FOOD YOU BOUGHT JUST DIDN'T LAST AND YOU DIDN'T HAVE MONEY TO GET MORE.: PATIENT DECLINED

## 2025-03-10 SDOH — ECONOMIC STABILITY: TRANSPORTATION INSECURITY
IN THE PAST 12 MONTHS, HAS LACK OF TRANSPORTATION KEPT YOU FROM MEETINGS, WORK, OR FROM GETTING THINGS NEEDED FOR DAILY LIVING?: PATIENT DECLINED

## 2025-03-10 SDOH — ECONOMIC STABILITY: INCOME INSECURITY: IN THE LAST 12 MONTHS, WAS THERE A TIME WHEN YOU WERE NOT ABLE TO PAY THE MORTGAGE OR RENT ON TIME?: PATIENT DECLINED

## 2025-03-10 ASSESSMENT — PATIENT HEALTH QUESTIONNAIRE - PHQ9
1. LITTLE INTEREST OR PLEASURE IN DOING THINGS: NOT AT ALL
SUM OF ALL RESPONSES TO PHQ QUESTIONS 1-9: 0
SUM OF ALL RESPONSES TO PHQ9 QUESTIONS 1 & 2: 0
1. LITTLE INTEREST OR PLEASURE IN DOING THINGS: NOT AT ALL
2. FEELING DOWN, DEPRESSED OR HOPELESS: NOT AT ALL
2. FEELING DOWN, DEPRESSED OR HOPELESS: NOT AT ALL

## 2025-03-11 ENCOUNTER — OFFICE VISIT (OUTPATIENT)
Dept: INTERNAL MEDICINE CLINIC | Age: 55
End: 2025-03-11
Payer: COMMERCIAL

## 2025-03-11 VITALS
WEIGHT: 256.6 LBS | HEIGHT: 72 IN | OXYGEN SATURATION: 96 % | HEART RATE: 59 BPM | BODY MASS INDEX: 34.75 KG/M2 | DIASTOLIC BLOOD PRESSURE: 78 MMHG | SYSTOLIC BLOOD PRESSURE: 110 MMHG

## 2025-03-11 DIAGNOSIS — Z12.5 SCREENING FOR PROSTATE CANCER: ICD-10-CM

## 2025-03-11 DIAGNOSIS — Z23 NEED FOR PNEUMOCOCCAL 20-VALENT CONJUGATE VACCINATION: ICD-10-CM

## 2025-03-11 DIAGNOSIS — E78.00 PURE HYPERCHOLESTEROLEMIA: ICD-10-CM

## 2025-03-11 DIAGNOSIS — I25.10 CORONARY ARTERY DISEASE INVOLVING NATIVE CORONARY ARTERY OF NATIVE HEART WITHOUT ANGINA PECTORIS: Primary | ICD-10-CM

## 2025-03-11 DIAGNOSIS — R79.89 ELEVATED SERUM CREATININE: ICD-10-CM

## 2025-03-11 DIAGNOSIS — I10 ESSENTIAL HYPERTENSION: ICD-10-CM

## 2025-03-11 PROCEDURE — 3078F DIAST BP <80 MM HG: CPT | Performed by: INTERNAL MEDICINE

## 2025-03-11 PROCEDURE — 99214 OFFICE O/P EST MOD 30 MIN: CPT | Performed by: INTERNAL MEDICINE

## 2025-03-11 PROCEDURE — 90677 PCV20 VACCINE IM: CPT | Performed by: INTERNAL MEDICINE

## 2025-03-11 PROCEDURE — 3074F SYST BP LT 130 MM HG: CPT | Performed by: INTERNAL MEDICINE

## 2025-03-11 PROCEDURE — 90471 IMMUNIZATION ADMIN: CPT | Performed by: INTERNAL MEDICINE

## 2025-03-11 ASSESSMENT — ENCOUNTER SYMPTOMS
VOMITING: 0
PHOTOPHOBIA: 0
NAUSEA: 0
SHORTNESS OF BREATH: 0
TROUBLE SWALLOWING: 0
ABDOMINAL PAIN: 0
VOICE CHANGE: 0

## 2025-03-11 NOTE — PROGRESS NOTES
Marques Peng  1970  male  54 y.o.    SUBJECTIVE:    Chief Complaint   Patient presents with    6 Month Follow-Up     Patient is here for a 6 month follow up, no other concerns. Patient did have lab work through his job.       History of Present Illness  The patient presents for a routine checkup.    He acknowledges the need to enhance his physical activity, attributing his current sedentary lifestyle to his work commitments. He has a home gym and is making efforts to incorporate morning exercises into his routine. He also recognizes the necessity of weight loss. He has been on Repatha and suspects it may be contributing to elevated glucose levels. He has scheduled a stress test and vascular screening for 06/2025, along with a comprehensive lab workup. He reports no chest pain or leg swelling. He has received his influenza vaccine but has not yet received the pneumonia vaccine. He has gained 7 pounds since last August. His current medication regimen includes aspirin, Plavix, and atorvastatin.    MEDICATIONS  Repatha, aspirin, Plavix, atorvastatin    IMMUNIZATIONS  He has received his influenza vaccine.         Past Medical History:   Diagnosis Date    Coronary artery disease involving native coronary artery of native heart with angina pectoris     ~Successful PCI to LAD with 4.0x8, 3.5x38 and 3.0x15 GUALBERTO. PD with 3.75x12 NC to 20atm. Excellent Result    Hyperlipidemia     Hypertension      Past Surgical History:   Procedure Laterality Date    CARDIAC CATHETERIZATION      CORONARY ANGIOPLASTY WITH STENT PLACEMENT  2020    Dr.. Warner and 1/2023    TONSILLECTOMY AND ADENOIDECTOMY  1974     Social History     Socioeconomic History    Marital status:      Spouse name: None    Number of children: 3    Years of education: None    Highest education level: None   Occupational History    Occupation:      Comment: [employer]    Occupation: Dept Mgr   Tobacco Use    Smoking status: Never

## 2025-03-19 ENCOUNTER — OFFICE VISIT (OUTPATIENT)
Dept: CARDIOLOGY CLINIC | Age: 55
End: 2025-03-19
Payer: COMMERCIAL

## 2025-03-19 VITALS
SYSTOLIC BLOOD PRESSURE: 118 MMHG | HEIGHT: 72 IN | OXYGEN SATURATION: 96 % | TEMPERATURE: 97.2 F | WEIGHT: 258.3 LBS | BODY MASS INDEX: 34.98 KG/M2 | DIASTOLIC BLOOD PRESSURE: 80 MMHG | HEART RATE: 54 BPM

## 2025-03-19 DIAGNOSIS — Z98.61 S/P CORONARY ANGIOPLASTY: ICD-10-CM

## 2025-03-19 DIAGNOSIS — R73.03 PREDIABETES: ICD-10-CM

## 2025-03-19 DIAGNOSIS — I25.10 CORONARY ARTERY DISEASE DUE TO LIPID RICH PLAQUE: ICD-10-CM

## 2025-03-19 DIAGNOSIS — I10 ESSENTIAL HYPERTENSION: ICD-10-CM

## 2025-03-19 DIAGNOSIS — E78.2 MIXED HYPERLIPIDEMIA: Primary | ICD-10-CM

## 2025-03-19 DIAGNOSIS — I25.83 CORONARY ARTERY DISEASE DUE TO LIPID RICH PLAQUE: ICD-10-CM

## 2025-03-19 PROCEDURE — 3074F SYST BP LT 130 MM HG: CPT | Performed by: INTERNAL MEDICINE

## 2025-03-19 PROCEDURE — 99214 OFFICE O/P EST MOD 30 MIN: CPT | Performed by: INTERNAL MEDICINE

## 2025-03-19 PROCEDURE — 3079F DIAST BP 80-89 MM HG: CPT | Performed by: INTERNAL MEDICINE

## 2025-03-19 NOTE — PATIENT INSTRUCTIONS
If changes noted on stress test, consider [facility]  No medication changes   Continue risk factor modifications.   Call for any change in symptoms, call to report any changes in shortness of breath or development of chest pain with activity.    Follow up in 6 mos, before this visit check cmp, cbc, lipids (labs)

## 2025-05-17 ENCOUNTER — OFFICE VISIT (OUTPATIENT)
Age: 55
End: 2025-05-17

## 2025-05-17 DIAGNOSIS — S61.412A LACERATION OF LEFT HAND WITHOUT FOREIGN BODY, INITIAL ENCOUNTER: Primary | ICD-10-CM

## 2025-05-17 DIAGNOSIS — S61.459A DOG BITE OF HAND WITHOUT COMPLICATION, INITIAL ENCOUNTER: ICD-10-CM
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
## 2025-05-17 RX ORDER — MUPIROCIN 20 MG/G
OINTMENT TOPICAL
Qty: 1 G | Refills: 0 | Status: SHIPPED | OUTPATIENT
Start: 2025-05-17 | End: 2025-05-24

## 2025-05-17 RX ORDER — DOXYCYCLINE HYCLATE 100 MG
100 TABLET ORAL 2 TIMES DAILY
Qty: 14 TABLET | Refills: 0 | Status: SHIPPED | OUTPATIENT
Start: 2025-05-17 | End: 2025-05-24

## 2025-05-17 NOTE — PATIENT INSTRUCTIONS
Tetanus shot given today    Keep hand elevated    Follow up with your primary care provider as discussed.    Take medication as prescribed.    Increase fluid intake    [place], soft diet.    Take a medicine like acetaminophen (sample brand name: Tylenol) or ibuprofen (sample brand names: Advil, Motrin) to help bring down your fever or for discomfort.    Go to the nearest emergency room for symptoms including, but not limited to, numbness in fingers, color change of fingers, purulent drainage, increased pain, bad odor from wound, increase in redness, red streak going up arm, shortness of breath, chest pain, mental status change, fevers, difficulty or inability to swallow, dehydration, or if symptoms worsen.     Marques,    Thank you for trusting [facility] with your care. Your decision to come to us means a lot and we are honored to be part of your healthcare journey. We value your trust and hope your experience with us was positive and met your expectations.    We're always looking for ways to improve, and your feedback is incredibly important to us. You will receive a text or email soon asking you how your visit went. for If you could take a moment to share your thoughts, it would mean the world to us. Your input helps us better serve you and others in the community.     Thank you again for choosing us. We're grateful for the opportunity to care for you and your loved ones. We hope to see you again - though we always wish you health and wellness!    Warm regards,    [facility]    Maame Marina, CHARLIE Hernandez, SHY Sadler,

## 2025-05-17 NOTE — PROGRESS NOTES
Marques Peng (:  1970) is a 54 y.o. male,New patient, here for evaluation of the following chief complaint(s):  Animal Bite (Patient c/o having a dog bite, left hand this AM)    Patient here for evaluation of dog bite to palmar and dorsal surface of hand. He is on aspirin and Plavix daily.  Pressure was applied and bleeding is controlled. Empiric antibiotic coverage started, Augmentin. Return vs hospital precautions reviewed.     ASSESSMENT/PLAN:  1. Laceration of left hand without foreign body, initial encounter  -Wound was cleansed with wound , Bacitracin applied, dry gauze, and wrapped.  Bleeding controlled at this time.     - Tdap, ADACEL, (age 10y-64y), IM  - mupirocin (BACTROBAN) 2 % ointment; Apply topically 3 times daily.  Dispense: 1 g; Refill: 0    -You may take Acetaminophen or Ibuprofen as directed on packaging for fever or discomfort.     2. Dog bite of hand without complication, initial encounter    - Tdap, ADACEL, (age 10y-64y), IM       Return if symptoms worsen or fail to improve.     AVS reviewed. All questions answered.  Instructions were acknowledged and verbalized by the patient.     SUBJECTIVE/OBJECTIVE:  HPI:   54 y.o. male presents for complaint of his dog bit him about 2 hours ago. His dog is up to date on vaccines per patient report.  Dog became irritable with grooming.      Admits symptoms include mild swelling and tenderness  Denies shortness of breath, rash, and fever      Elevation and compresssion makes symptoms better.  Nothing makes symptoms worse.    Has attempted nothing for symptoms     HPI provided by Patient  and is considered to be a reliable historian.        History provided by:  Patient and spouse  Animal Bite   Pertinent negatives include no chest pain, no abdominal pain, no nausea, no vomiting, no headaches, no light-headedness and no cough.       Vitals:    25 1320   BP: 127/88   BP Site: Left Upper Arm   Patient Position: Sitting   BP Cuff Size:

## 2025-05-18 VITALS
WEIGHT: 255 LBS | SYSTOLIC BLOOD PRESSURE: 122 MMHG | HEART RATE: 84 BPM | DIASTOLIC BLOOD PRESSURE: 76 MMHG | TEMPERATURE: 98 F | BODY MASS INDEX: 34.54 KG/M2 | HEIGHT: 72 IN | OXYGEN SATURATION: 93 % | RESPIRATION RATE: 18 BRPM

## 2025-05-21 RX ORDER — METOPROLOL TARTRATE 25 MG/1
25 TABLET, FILM COATED ORAL 2 TIMES DAILY
Qty: 180 TABLET | Refills: 1 | Status: SHIPPED | OUTPATIENT
Start: 2025-05-21

## 2025-05-21 NOTE — TELEPHONE ENCOUNTER
OKAY FOR FILL    Medication:   Requested Prescriptions     Pending Prescriptions Disp Refills    metoprolol tartrate (LOPRESSOR) 25 MG tablet [Pharmacy Med Name: METOPROLOL TARTRATE 25 MG TAB] 180 tablet 1     Sig: TAKE 1 TABLET BY MOUTH TWICE A DAY        Last Ordered: 11/25/24  Last Filled:  2/22/25    Patient Phone Number: [phone] (home) [phone] (work)    Last appt: 3/11/2025   Next appt: 9/16/2025    Last OARRS:        No data to display

## 2025-06-04 DIAGNOSIS — Z00.00 ANNUAL PHYSICAL EXAM: Primary | ICD-10-CM

## 2025-06-06 DIAGNOSIS — Z00.00 ROUTINE PHYSICAL EXAMINATION: Primary | ICD-10-CM

## 2025-06-10 DIAGNOSIS — Z00.00 ANNUAL PHYSICAL EXAM: ICD-10-CM

## 2025-06-16 ENCOUNTER — HOSPITAL ENCOUNTER (OUTPATIENT)
Age: 55
Discharge: HOME | End: 2025-06-16

## 2025-06-16 ENCOUNTER — HOSPITAL ENCOUNTER (OUTPATIENT)
Dept: VASCULAR LAB | Age: 55
Discharge: HOME | End: 2025-06-18

## 2025-06-16 DIAGNOSIS — Z00.00 ANNUAL PHYSICAL EXAM: ICD-10-CM

## 2025-06-16 DIAGNOSIS — Z00.00 ROUTINE PHYSICAL EXAMINATION: ICD-10-CM

## 2025-06-16 LAB
ALBUMIN SERPL-MCNC: 4.1 G/DL (ref 3.4–5)
ALBUMIN/GLOB SERPL: 1.6 {RATIO} (ref 1.1–2.2)
ALP SERPL-CCNC: 51 U/L (ref 40–129)
ALT SERPL-CCNC: 47 U/L (ref 10–40)
ANION GAP SERPL CALCULATED.3IONS-SCNC: 9 MMOL/L (ref 3–16)
AST SERPL-CCNC: 29 U/L (ref 15–37)
BACTERIA URNS QL MICRO: NORMAL /HPF
BASOPHILS # BLD: 0 K/UL (ref 0–0.2)
BASOPHILS NFR BLD: 0.7 %
BILIRUB DIRECT SERPL-MCNC: 0.2 MG/DL (ref 0–0.3)
BILIRUB INDIRECT SERPL-MCNC: 0.4 MG/DL (ref 0–1)
BILIRUB SERPL-MCNC: 0.6 MG/DL (ref 0–1)
BILIRUB UR QL STRIP.AUTO: NEGATIVE
BUN SERPL-MCNC: 23 MG/DL (ref 7–20)
CALCIUM SERPL-MCNC: 9.1 MG/DL (ref 8.3–10.6)
CHLORIDE SERPL-SCNC: 105 MMOL/L (ref 99–110)
CHOLEST SERPL-MCNC: 85 MG/DL (ref 0–199)
CLARITY UR: CLEAR
CO2 SERPL-SCNC: 23 MMOL/L (ref 21–32)
COLOR UR: YELLOW
CREAT SERPL-MCNC: 1.3 MG/DL (ref 0.9–1.3)
DEPRECATED RDW RBC AUTO: 14.5 % (ref 12.4–15.4)
EOSINOPHIL # BLD: 0.3 K/UL (ref 0–0.6)
EOSINOPHIL NFR BLD: 5.1 %
EPI CELLS #/AREA URNS AUTO: 0 /HPF (ref 0–5)
GFR SERPLBLD CREATININE-BSD FMLA CKD-EPI: 64 ML/MIN/{1.73_M2}
GLUCOSE SERPL-MCNC: 120 MG/DL (ref 70–99)
GLUCOSE UR STRIP.AUTO-MCNC: NEGATIVE MG/DL
HCT VFR BLD AUTO: 48.3 % (ref 40.5–52.5)
HCV AB SERPL QL IA: NORMAL
HDLC SERPL-MCNC: 34 MG/DL (ref 40–60)
HGB BLD-MCNC: 16.5 G/DL (ref 13.5–17.5)
HGB UR QL STRIP.AUTO: ABNORMAL
HYALINE CASTS #/AREA URNS AUTO: 0 /LPF (ref 0–8)
KETONES UR STRIP.AUTO-MCNC: NEGATIVE MG/DL
LDLC SERPL CALC-MCNC: 26 MG/DL
LEUKOCYTE ESTERASE UR QL STRIP.AUTO: NEGATIVE
LYMPHOCYTES # BLD: 1.2 K/UL (ref 1–5.1)
LYMPHOCYTES NFR BLD: 23.4 %
MCH RBC QN AUTO: 30.1 PG (ref 26–34)
MCHC RBC AUTO-ENTMCNC: 34.1 G/DL (ref 31–36)
MCV RBC AUTO: 88.2 FL (ref 80–100)
MONOCYTES # BLD: 0.7 K/UL (ref 0–1.3)
MONOCYTES NFR BLD: 12.6 %
NEUTROPHILS # BLD: 3 K/UL (ref 1.7–7.7)
NEUTROPHILS NFR BLD: 58.2 %
NITRITE UR QL STRIP.AUTO: NEGATIVE
PH UR STRIP.AUTO: 6 [PH] (ref 5–8)
PLATELET # BLD AUTO: 158 K/UL (ref 135–450)
PMV BLD AUTO: 10 FL (ref 5–10.5)
POTASSIUM SERPL-SCNC: 4.4 MMOL/L (ref 3.5–5.1)
PROT SERPL-MCNC: 6.6 G/DL (ref 6.4–8.2)
PROT UR STRIP.AUTO-MCNC: NEGATIVE MG/DL
PSA SERPL DL<=0.01 NG/ML-MCNC: 0.73 NG/ML (ref 0–4)
RBC # BLD AUTO: 5.47 M/UL (ref 4.2–5.9)
RBC CLUMPS #/AREA URNS AUTO: 0 /HPF (ref 0–4)
SODIUM SERPL-SCNC: 137 MMOL/L (ref 136–145)
SP GR UR STRIP.AUTO: <=1.005 (ref 1–1.03)
T3 SERPL-MCNC: 1.29 NG/ML (ref 0.8–2)
T4 SERPL-MCNC: 8 UG/DL (ref 4.5–10.9)
TRIGL SERPL-MCNC: 124 MG/DL (ref 0–150)
UA DIPSTICK W REFLEX MICRO PNL UR: YES
URN SPEC COLLECT METH UR: ABNORMAL
UROBILINOGEN UR STRIP-ACNC: 0.2 E.U./DL
VAS AORTA DIST AP: 1.71 CM
VAS AORTA DIST TR: 1.63 CM
VAS AORTA MID AP: 1.77 CM
VAS AORTA MID TRANS: 1.77 CM
VAS AORTA PROX AP: 2.05 CM
VAS AORTA PROX TR: 1.61 CM
VAS LEFT ABI: 1.16
VAS LEFT ARM BP: 148 MMHG
VAS LEFT DORSALIS PEDIS BP: 158 MMHG
VAS LEFT ICA PROX EDV: 29.4 CM/S
VAS LEFT ICA PROX PSV: 72 CM/S
VAS LEFT PTA BP: 172 MMHG
VAS RIGHT ABI: 1.16
VAS RIGHT ARM BP: 146 MMHG
VAS RIGHT DORSALIS PEDIS BP: 172 MMHG
VAS RIGHT ICA PROX EDV: 32.5 CM/S
VAS RIGHT ICA PROX PSV: 80.8 CM/S
VAS RIGHT PTA BP: 170 MMHG
VLDLC SERPL CALC-MCNC: 25 MG/DL
WBC # BLD AUTO: 5.2 K/UL (ref 4–11)
WBC #/AREA URNS AUTO: 0 /HPF (ref 0–5)

## 2025-06-16 PROCEDURE — 86803 HEPATITIS C AB TEST: CPT

## 2025-06-16 PROCEDURE — 9900000021 VAS SCREENING (CAROTID/ABDOMINAL/ABI LTD)

## 2025-06-16 PROCEDURE — 80061 LIPID PANEL: CPT

## 2025-06-16 PROCEDURE — 93880 EXTRACRANIAL BILAT STUDY: CPT | Performed by: SURGERY

## 2025-06-16 PROCEDURE — 85025 COMPLETE CBC W/AUTO DIFF WBC: CPT

## 2025-06-16 PROCEDURE — 82248 BILIRUBIN DIRECT: CPT

## 2025-06-16 PROCEDURE — 76706 US ABDL AORTA SCREEN AAA: CPT | Performed by: SURGERY

## 2025-06-16 PROCEDURE — 84436 ASSAY OF TOTAL THYROXINE: CPT

## 2025-06-16 PROCEDURE — 84153 ASSAY OF PSA TOTAL: CPT

## 2025-06-16 PROCEDURE — 93922 UPR/L XTREMITY ART 2 LEVELS: CPT | Performed by: SURGERY

## 2025-06-16 PROCEDURE — 80053 COMPREHEN METABOLIC PANEL: CPT

## 2025-06-16 PROCEDURE — 84480 ASSAY TRIIODOTHYRONINE (T3): CPT

## 2025-06-16 PROCEDURE — 36415 COLL VENOUS BLD VENIPUNCTURE: CPT

## 2025-06-16 PROCEDURE — 81001 URINALYSIS AUTO W/SCOPE: CPT

## 2025-06-17 ENCOUNTER — RESULTS FOLLOW-UP (OUTPATIENT)
Dept: INTERNAL MEDICINE CLINIC | Age: 55
End: 2025-06-17

## 2025-06-18 ENCOUNTER — OFFICE VISIT (OUTPATIENT)
Dept: CARDIOLOGY CLINIC | Age: 55
End: 2025-06-18

## 2025-06-18 ENCOUNTER — HOSPITAL ENCOUNTER (OUTPATIENT)
Age: 55
Discharge: HOME | End: 2025-06-20

## 2025-06-18 VITALS
DIASTOLIC BLOOD PRESSURE: 82 MMHG | HEIGHT: 72 IN | BODY MASS INDEX: 34.75 KG/M2 | OXYGEN SATURATION: 97 % | SYSTOLIC BLOOD PRESSURE: 132 MMHG | WEIGHT: 256.6 LBS | HEART RATE: 77 BPM

## 2025-06-18 VITALS
HEART RATE: 79 BPM | BODY MASS INDEX: 34.8 KG/M2 | HEIGHT: 72 IN | RESPIRATION RATE: 12 BRPM | DIASTOLIC BLOOD PRESSURE: 92 MMHG | SYSTOLIC BLOOD PRESSURE: 137 MMHG

## 2025-06-18 DIAGNOSIS — Z00.00 PHYSICAL EXAM: ICD-10-CM

## 2025-06-18 DIAGNOSIS — Z00.00 ANNUAL PHYSICAL EXAM: Primary | ICD-10-CM

## 2025-06-18 LAB
ECHO BSA: 2.43 M2
STRESS BASELINE DIAS BP: 92 MMHG
STRESS BASELINE HR: 79 BPM
STRESS BASELINE SYS BP: 137 MMHG
STRESS ESTIMATED WORKLOAD: 13.4 METS
STRESS EXERCISE DUR MIN: 11 MIN
STRESS EXERCISE DUR SEC: 6 SEC
STRESS O2 SAT PEAK: 98 %
STRESS O2 SAT REST: 96 %
STRESS PEAK DIAS BP: 61 MMHG
STRESS PEAK SYS BP: 177 MMHG
STRESS PERCENT HR ACHIEVED: 87 %
STRESS POST PEAK HR: 141 BPM
STRESS RATE PRESSURE PRODUCT: NORMAL BPM*MMHG
STRESS TARGET HR: 163 BPM

## 2025-06-18 PROCEDURE — 93017 CV STRESS TEST TRACING ONLY: CPT

## 2025-06-18 NOTE — PROGRESS NOTES
Yellow   Clarity, UA Clear  Clear   Glucose, Ur Negative mg/dL Negative   Bilirubin, Urine Negative  Negative   Ketones, Urine Negative mg/dL Negative   Specific Gravity, UA 1.005 - 1.030  <=1.005   Blood, Urine Negative  SMALL !   Protein, UA Negative mg/dL Negative   Urobilinogen <2.0 E.U./dL 0.2   Nitrite, Urine Negative  Negative   Leukocyte Esterase, Urine Negative  Negative   Urine Type  Voided   pH, Urine 5.0 - 8.0  6.0   Hyaline Casts, UA 0 - 8 /LPF 0   WBC, UA 0 - 5 /HPF 0   RBC, UA 0 - 4 /HPF 0   Epithelial Cells, UA 0 - 5 /HPF 0   Bacteria, UA None Seen /HPF None Seen   Microscopic Examination  YES       6/18/25:Stool guaiac: negative    6/16/25:Vascular screening:        Right side findings: Resting GINETTE is 1.16. This is within the normal range.    Left side findings: Resting GINETTE is 1.16. This is within the normal range.    No stenosis in the right internal carotid artery.    No stenosis in the left internal carotid artery.    Adominal Aorta is within normal limits.      6/18/25:GXT:   Resting ECG The ECG shows sinus rhythm. Resting ECG shows non-specific ST-segment abnormalities and non-specific T wave abnormality.   Stress ECG There were no arrhythmias during stress. Exacerbation of baseline ST abnormality was noted without ischemic ST segment changes. There were no noted arrhythmias during recovery. The stress ECG was negative for ischemia.   Stress Test A Vernon protocol stress test was performed. The patient reached stage 4 of the protocol and was stressed for 11 min and 6 sec. The patient reported dyspnea during the stress test. Symptoms began during stress and ended during recovery. The patient achieved the target heart rate. The patient requested the test to be stopped. Blood pressure demonstrated a normal response to stress. The patient's heart rate recovery was normal.     Stress Measurements    Baseline Vitals   Baseline HR 79 BPM         Baseline Systolic  mmHg         Baseline Diastolic